# Patient Record
Sex: MALE | Race: BLACK OR AFRICAN AMERICAN | NOT HISPANIC OR LATINO | URBAN - METROPOLITAN AREA
[De-identification: names, ages, dates, MRNs, and addresses within clinical notes are randomized per-mention and may not be internally consistent; named-entity substitution may affect disease eponyms.]

---

## 2019-12-23 ENCOUNTER — INPATIENT (INPATIENT)
Facility: HOSPITAL | Age: 64
LOS: 2 days | Discharge: HOME | DRG: 555 | End: 2019-12-26
Attending: INTERNAL MEDICINE | Admitting: INTERNAL MEDICINE
Payer: MEDICARE

## 2019-12-23 VITALS
RESPIRATION RATE: 14 BRPM | TEMPERATURE: 98 F | OXYGEN SATURATION: 89 % | DIASTOLIC BLOOD PRESSURE: 69 MMHG | HEART RATE: 108 BPM | SYSTOLIC BLOOD PRESSURE: 108 MMHG

## 2019-12-23 DIAGNOSIS — Z85.118 PERSONAL HISTORY OF OTHER MALIGNANT NEOPLASM OF BRONCHUS AND LUNG: Chronic | ICD-10-CM

## 2019-12-23 LAB
ALBUMIN SERPL ELPH-MCNC: 3.4 G/DL — SIGNIFICANT CHANGE UP (ref 3.3–5)
ALP SERPL-CCNC: 89 U/L — SIGNIFICANT CHANGE UP (ref 40–120)
ALT FLD-CCNC: 6 U/L — LOW (ref 10–45)
ANION GAP SERPL CALC-SCNC: 14 MMOL/L — SIGNIFICANT CHANGE UP (ref 5–17)
AST SERPL-CCNC: 13 U/L — SIGNIFICANT CHANGE UP (ref 10–40)
BASOPHILS # BLD AUTO: 0.04 K/UL — SIGNIFICANT CHANGE UP (ref 0–0.2)
BASOPHILS NFR BLD AUTO: 0.5 % — SIGNIFICANT CHANGE UP (ref 0–2)
BILIRUB SERPL-MCNC: 0.2 MG/DL — SIGNIFICANT CHANGE UP (ref 0.2–1.2)
BUN SERPL-MCNC: 13 MG/DL — SIGNIFICANT CHANGE UP (ref 7–23)
CALCIUM SERPL-MCNC: 8.1 MG/DL — LOW (ref 8.4–10.5)
CHLORIDE SERPL-SCNC: 102 MMOL/L — SIGNIFICANT CHANGE UP (ref 96–108)
CO2 SERPL-SCNC: 22 MMOL/L — SIGNIFICANT CHANGE UP (ref 22–31)
CREAT SERPL-MCNC: 1.57 MG/DL — HIGH (ref 0.5–1.3)
EOSINOPHIL # BLD AUTO: 0.07 K/UL — SIGNIFICANT CHANGE UP (ref 0–0.5)
EOSINOPHIL NFR BLD AUTO: 0.8 % — SIGNIFICANT CHANGE UP (ref 0–6)
GAS PNL BLDV: SIGNIFICANT CHANGE UP
GLUCOSE SERPL-MCNC: 109 MG/DL — HIGH (ref 70–99)
HBA1C BLD-MCNC: 5.6 % — SIGNIFICANT CHANGE UP (ref 4–5.6)
HCT VFR BLD CALC: 30.1 % — LOW (ref 39–50)
HGB BLD-MCNC: 9.2 G/DL — LOW (ref 13–17)
IMM GRANULOCYTES NFR BLD AUTO: 0.3 % — SIGNIFICANT CHANGE UP (ref 0–1.5)
LYMPHOCYTES # BLD AUTO: 0.92 K/UL — LOW (ref 1–3.3)
LYMPHOCYTES # BLD AUTO: 10.5 % — LOW (ref 13–44)
MCHC RBC-ENTMCNC: 26.7 PG — LOW (ref 27–34)
MCHC RBC-ENTMCNC: 30.6 GM/DL — LOW (ref 32–36)
MCV RBC AUTO: 87.5 FL — SIGNIFICANT CHANGE UP (ref 80–100)
MONOCYTES # BLD AUTO: 0.49 K/UL — SIGNIFICANT CHANGE UP (ref 0–0.9)
MONOCYTES NFR BLD AUTO: 5.6 % — SIGNIFICANT CHANGE UP (ref 2–14)
NEUTROPHILS # BLD AUTO: 7.23 K/UL — SIGNIFICANT CHANGE UP (ref 1.8–7.4)
NEUTROPHILS NFR BLD AUTO: 82.3 % — HIGH (ref 43–77)
NRBC # BLD: 0 /100 WBCS — SIGNIFICANT CHANGE UP (ref 0–0)
PCP SPEC-MCNC: SIGNIFICANT CHANGE UP
PLATELET # BLD AUTO: 335 K/UL — SIGNIFICANT CHANGE UP (ref 150–400)
POTASSIUM SERPL-MCNC: 3.4 MMOL/L — LOW (ref 3.5–5.3)
POTASSIUM SERPL-SCNC: 3.4 MMOL/L — LOW (ref 3.5–5.3)
PROT SERPL-MCNC: 7.3 G/DL — SIGNIFICANT CHANGE UP (ref 6–8.3)
RBC # BLD: 3.44 M/UL — LOW (ref 4.2–5.8)
RBC # FLD: 14.5 % — SIGNIFICANT CHANGE UP (ref 10.3–14.5)
SODIUM SERPL-SCNC: 138 MMOL/L — SIGNIFICANT CHANGE UP (ref 135–145)
TSH SERPL-MCNC: 6.98 UIU/ML — HIGH (ref 0.35–4.94)
WBC # BLD: 8.78 K/UL — SIGNIFICANT CHANGE UP (ref 3.8–10.5)
WBC # FLD AUTO: 8.78 K/UL — SIGNIFICANT CHANGE UP (ref 3.8–10.5)

## 2019-12-23 PROCEDURE — 70450 CT HEAD/BRAIN W/O DYE: CPT | Mod: 26

## 2019-12-23 PROCEDURE — 99223 1ST HOSP IP/OBS HIGH 75: CPT | Mod: GC

## 2019-12-23 PROCEDURE — 99232 SBSQ HOSP IP/OBS MODERATE 35: CPT

## 2019-12-23 PROCEDURE — 99285 EMERGENCY DEPT VISIT HI MDM: CPT

## 2019-12-23 PROCEDURE — 71045 X-RAY EXAM CHEST 1 VIEW: CPT | Mod: 26

## 2019-12-23 PROCEDURE — 93010 ELECTROCARDIOGRAM REPORT: CPT

## 2019-12-23 RX ORDER — SODIUM CHLORIDE 9 MG/ML
1000 INJECTION INTRAMUSCULAR; INTRAVENOUS; SUBCUTANEOUS ONCE
Refills: 0 | Status: COMPLETED | OUTPATIENT
Start: 2019-12-23 | End: 2019-12-23

## 2019-12-23 RX ORDER — IPRATROPIUM/ALBUTEROL SULFATE 18-103MCG
3 AEROSOL WITH ADAPTER (GRAM) INHALATION ONCE
Refills: 0 | Status: COMPLETED | OUTPATIENT
Start: 2019-12-23 | End: 2019-12-23

## 2019-12-23 RX ORDER — DEXAMETHASONE 0.5 MG/5ML
10 ELIXIR ORAL ONCE
Refills: 0 | Status: COMPLETED | OUTPATIENT
Start: 2019-12-23 | End: 2019-12-23

## 2019-12-23 RX ADMIN — Medication 1 MILLIGRAM(S): at 20:18

## 2019-12-23 RX ADMIN — Medication 3 MILLILITER(S): at 17:11

## 2019-12-23 RX ADMIN — Medication 10 MILLIGRAM(S): at 21:33

## 2019-12-23 RX ADMIN — SODIUM CHLORIDE 1000 MILLILITER(S): 9 INJECTION INTRAMUSCULAR; INTRAVENOUS; SUBCUTANEOUS at 17:11

## 2019-12-23 RX ADMIN — Medication 102 MILLIGRAM(S): at 18:31

## 2019-12-23 RX ADMIN — Medication 3 MILLILITER(S): at 17:20

## 2019-12-23 RX ADMIN — Medication 3 MILLILITER(S): at 17:12

## 2019-12-23 NOTE — ED ADULT TRIAGE NOTE - CHIEF COMPLAINT QUOTE
BIB EMS from methadone clinic, pt states "my legs don't work" though pt able to stand from EMS stretcher, pt noted to be 87 on RA though denies SOB; pt A/Ox3, denies alcohol/drug use today

## 2019-12-23 NOTE — H&P ADULT - PROBLEM SELECTOR PLAN 6
Hb 9.2, Pt describes complicated recent course at [facility] with large volume epistaxis   -F/u iron studies #PAD  States had "stent" in LLE, was on Plavix for stent, then stopped when had large volume epistaxis. Also left big toe amputation s/p OM. A1c 5.6   -Vascular consult in AM    Addendum: absent DP pulses on exam w/ slightly cool extremities, consult vascular in AM

## 2019-12-23 NOTE — H&P ADULT - ATTENDING COMMENTS
patient seen and examined a/f lower extremity weakness, rule out brain mets. pt poor historian, lethargic at times but able to awake and follow commands; pt denies fecal / urinary incontinence ; denies fevers  reviewed pertinent data  PE  findings:   gen: asleep, lethargic, able to wake up to voice, follows commands, oriented to person, place (LHH), unaware of time or does not wish to answer.   heent: no photophobia b/l, dry tongue  cvs: s1s2  lungs: decreased breath sounds b/l, pox iniitally 83% on RA, increased to 95-96% on 5LPM NC  chest: scar on left side of chest   abd: soft/nt/nd  ext: absent DP pulse; s/p toe amputations at right foot and partial left 3rd toe amputation   neuro: lethargic, follows commands when awake but falls back asleep, unable to cooperate with all examination 2/2 lethargy; 5/5 hand  b/l ; patient able to lift legs b/l, 5/5 motor at lower ext b/l   rectal: deferred as pt unable to agree to rectal exam    a/p:   1. weakness/ encephalopathy: possible brain mets, in addition pt was given ativan prior to MRI (not done 2/2 inability to consent); followup MRI brain and L spine; followup neurosurgery recs  2. hypoxia: occuring after given ativan for MRI, along w/ lung cancer history, pox improving on NC; monitor respiratory status, check ABG, low threshold for ICU consult if worsening respiratory sxs  3. lung cancer: check CT chest  4. methadone use: confirm methadone dose w/ clinic; pt w/ methadone in pockets, to be held for now.  5. obtain more collateral in AM patient seen and examined a/f lower extremity weakness, rule out brain mets. pt poor historian, lethargic at times but able to awake and follow commands; pt denies fecal / urinary incontinence ; denies fevers  reviewed pertinent data  PE  findings:   gen: asleep, lethargic, able to wake up to voice, follows commands, oriented to person, place (LHH), unaware of time or does not wish to answer.   heent: no photophobia b/l, dry tongue  cvs: s1s2  lungs: decreased breath sounds b/l, pox iniitally 83% on RA, increased to 95-96% on 5LPM NC  chest: scar on left side of chest   abd: soft/nt/nd  ext: absent DP pulse; s/p toe amputations at right foot and partial left 3rd toe amputation   neuro: lethargic, follows commands when awake but falls back asleep, unable to cooperate with all examination 2/2 lethargy; 5/5 hand  b/l ; patient able to lift legs b/l, 5/5 motor at lower ext b/l   rectal: deferred as pt unable to agree to rectal exam    a/p:   1. weakness/ encephalopathy: possible brain mets, in addition pt was given ativan prior to MRI (not done 2/2 inability to consent); followup MRI brain and L spine; followup neurosurgery recs  2. hypoxia: occuring after given ativan for MRI, along w/ lung cancer history, pox improving on NC; monitor respiratory status, check ABG, low threshold for ICU consult if worsening respiratory sxs  3. lung cancer: check CT chest  4. abnormal EKG: T wave inversions noted at v1-v5, follow troponins, and repeat EKG in AM    5. methadone use: confirm methadone dose w/ clinic; pt w/ methadone in pockets, to be held for now.  6. obtain more collateral in AM patient seen and examined a/f lower extremity weakness, rule out brain mets. pt poor historian, lethargic at times but able to awake and follow commands; pt denies fecal / urinary incontinence ; denies fevers  reviewed pertinent data  PE  findings:   gen: asleep, lethargic, able to wake up to voice, follows commands, oriented to person, place (LHH), unaware of time or does not wish to answer.   heent: no photophobia b/l, dry tongue  cvs: s1s2  lungs: decreased breath sounds b/l, pox iniitally 83% on RA, increased to 95-96% on 5LPM NC  chest: scar on left side of chest   abd: soft/nt/nd  ext: absent DP pulse; s/p toe amputations at right foot and partial left 3rd toe amputation   neuro: lethargic, follows commands when awake but falls back asleep, unable to cooperate with all examination 2/2 lethargy; 5/5 hand  b/l ; patient able to lift legs b/l, 5/5 motor at lower ext b/l   rectal: deferred as pt unable to agree to rectal exam    a/p:   1. weakness/ encephalopathy: possible brain mets, in addition pt was given ativan prior to MRI (not done 2/2 inability to consent); followup MRI brain and L spine; followup neurosurgery recs  2. hypoxia: occuring after given ativan for MRI, along w/ lung cancer history, pox improving on NC; monitor respiratory status, check ABG, low threshold for ICU consult if worsening respiratory sxs  3. lung cancer: check CT chest  4. abnormal EKG: T wave inversions noted at v1-v5, follow troponins, and repeat EKG in AM    5. methadone use: confirm methadone dose w/ clinic; pt w/ methadone in pockets, to be held for now.  6. obtain more collateral in AM    ADDENDUM: more awake, alert, responsive and following commands on regional floor; additional PE findings: Right lateral strabismus; knee OA b/l ; FROM of knees b/l

## 2019-12-23 NOTE — H&P ADULT - PROBLEM SELECTOR PLAN 8
F: none  E: replete PRN   N: dash/tlc/cc  DVT: lovenox 40  Dispo: RMF  Code: Full Has bottles for Methadone 80mg from Dr. Gómez [address] [facility] [phone]

## 2019-12-23 NOTE — CONSULT NOTE ADULT - SUBJECTIVE AND OBJECTIVE BOX
64M PMH lung ca (not on any current tx, unknown if any prior tx), prior heroin abuse now on methadone, denies any other PMH/PSH p/w b/l LE weakness, since today. States that every time he stands up he feels like his legs are giving out. HAs no other systemic symptoms. Denies HA, SOB/CP, f/c, URI symptoms, NVD, abd pain, urinary complaints, black/bloody stool, neck/back pain, rashes, vision changes, LE pain/swelling. Denies SI/HI, toxic ingestions. No vertiginous symptoms.    < from: CT Head No Cont (12.23.19 @ 17:00) >  microangiopathy disease. However, there may be a component from vasogenic edema, as there are couple of areas in left frontal subcortical white matter where there is concern for underlying lesions that could represent intracranial metastatic disease. Brain MR with contrast is recommended.    < end of copied text >    Exam:  AA&OX3, NAD, cohere speech, follows all commands,  disconjugate gaze, PERRL, face symmetric, tongue protr midline  motor 5/5 x 4 extr, no drift, no dysmetria  sensation to LT grossly intact throughout      Assessment and Recommendations:  Pt is  64M PMH lung ca (not on any current tx, unknown if any prior tx), prior heroin abuse now on methadone, presents to ED s/p fall " legs gave out", no LOC, Head CT: suspicious for mets  - MRI brain w/ & w/o cont  - Will make final recs upon completion of MRI brain  - D/w Dr. Moraes 64M PMH lung ca (not on any current tx, unknown if any prior tx), prior heroin abuse now on methadone, denies any other PMH/PSH p/w b/l LE weakness, since today. States that every time he stands up he feels like his legs are giving out. HAs no other systemic symptoms. Denies HA, SOB/CP, f/c, URI symptoms, NVD, abd pain, urinary complaints, black/bloody stool, neck/back pain, rashes, vision changes, LE pain/swelling. Denies SI/HI, toxic ingestions. No vertiginous symptoms.    Neurosurgery was consulted for Head CT suspicious for brain mets s/p mechanical fall at methadone clinic "due to legs giving out", witnessed, no LOC, , pt is poor historian, s/p left  chest tube [facility] 11/2019, pt states he was told he is cancer free, pt does not know if he has an oncologist.  Pt denies headaches, acute visual changes      < from: CT Head No Cont (12.23.19 @ 17:00) >  microangiopathy disease. However, there may be a component from vasogenic edema, as there are couple of areas in left frontal subcortical white matter where there is concern for underlying lesions that could represent intracranial metastatic disease. Brain MR with contrast is recommended.    < end of copied text >    Exam:  AA&OX3, NAD, cohere speech, follows all commands,  disconjugate gaze, PERRL, face symmetric, tongue protr midline  motor 5/5 x 4 extr, no drift, no dysmetria  sensation to LT grossly intact throughout      Assessment and Recommendations:  Pt is  64M PMH lung ca (not on any current tx, unknown if any prior tx), prior heroin abuse now on methadone, presents to ED s/p fall " legs gave out", no LOC, Head CT: suspicious for mets  - MRI brain w/ & w/o cont  - Will make final recs upon completion of MRI brain  - D/w Dr. Moraes

## 2019-12-23 NOTE — H&P ADULT - PROBLEM SELECTOR PLAN 7
Has bottles for Methadone 80mg from Dr. Gómez [address] [facility] [phone] Hb 9.2, Pt describes complicated recent course at [facility] with large volume epistaxis   -F/u iron studies

## 2019-12-23 NOTE — H&P ADULT - NSHPSOCIALHISTORY_GEN_ALL_CORE
former smoker (quit 5 years ago)  denies ETOH use  former drug use on methadone former smoker (quit 5 years ago)  denies ETOH use  former drug use on methadone  lives with wife/son

## 2019-12-23 NOTE — CONSULT NOTE ADULT - ATTENDING COMMENTS
Patient seen and examined by me on 12/24/19. Consultation called due to radiologist report of head CT indicating possible metastatic disease. On my review of CT head, there is no definitive evidence of metastatic disease and so will need MRI brain with and without contrast to be sure. Patient is awake, alert, following commands, BENTLEY x 4, able to ambulate independently. No neurosurgical intervention planned at this point. Will folllowup pending brain MRI with and without contrast findings.    Jeremias Moraes M.D.

## 2019-12-23 NOTE — ED ADULT NURSE NOTE - CHPI ED NUR SYMPTOMS NEG
no cough/no edema/no headache/no hemoptysis/no fever/no body aches/no diaphoresis/no chills/no chest pain/no wheezing

## 2019-12-23 NOTE — H&P ADULT - PROBLEM SELECTOR PLAN 4
Cre1.54, unclear baseline, no urinary complaints s/p 1L NS in ED   -Trend for now    #Hypothroidism   TSH 6.9, not on any thyroid medications per med rec Cre1.54, unclear baseline, no urinary complaints s/p 1L NS in ED   -Trend for now

## 2019-12-23 NOTE — H&P ADULT - PROBLEM SELECTOR PLAN 2
22/ Methadone use? Then ativan for MRI pretreatment. Now resolved.    #Emphysema/COPD  Severe confluent bullous emphysematous changes in the bilateral upper lung zones.Dilation the main pulmonary artery measuring up to 4.2 cm. Area of segmental atelectasis in the right lower lobe. No obvious metastasis are seen. On incruse ellipta,   C/w Spiriva and Albuterol PRN

## 2019-12-23 NOTE — H&P ADULT - PROBLEM SELECTOR PLAN 1
Pt BIBEMS after being found down in street or methadone clinic Pt BIBEMS after being found down in street by methadone clinic, c/o progressive weakness since d/c from [place], asymmetric LLE>RLE weakness, mute babinksi, (however unclear if also due to neuropathy) without fevers, urinary retention, saddle anesthesia. CTH with possible vasogenic edema, concern for metastatic disease. Folate 2.8. S/p Decadron 10 in ED  -Consent Pt for MRI head and lumbar spine (states no device or metal in body)   -Will hold further decadron for now as no FND  -Start folate 1g qd   -Untreated hypothyroid: f/u T4/T3  -F/u RPR (denies hx)  -PT consult Pt BIBEMS after being found down in street by methadone clinic, c/o progressive weakness since d/c from [place], asymmetric LLE>RLE weakness, mute babinksi, (however unclear if also due to neuropathy) without fevers, urinary retention, saddle anesthesia. CTH with possible vasogenic edema, concern for metastatic disease. Folate 2.8. S/p Decadron 10 in ED  -Consent Pt for MRI head and lumbar spine (states no device or metal in body)   -Will hold further decadron for now as no FND  -Start folate 1g qd   -Untreated hypothyroid: f/u T4/T3  -F/u RPR (denies hx)  -PT consult    ADDENDUM: pt more awake and alert on regional floor; reports having chronic knee pain causing legs to give way, may have element of knee OA , but given CT head findings w/ hx of lung cancer, will need to rule out brain and lumbar spinal cord lesions.

## 2019-12-23 NOTE — ED ADULT NURSE NOTE - OBJECTIVE STATEMENT
BIB EMS from methadone clinic, pt states "my legs don't work" though pt able to stand from EMS stretcher, pt noted to be 89 on RA though denies SOB; pt A/Ox3, denies alcohol/drug use today.  Upon further questioning pt reports taking methadone today. Eyes are dilated, Pt is alert and oriented x3. Pt reports intermittent SOB, recent hx of lung CA surgery.  Lung sounds diminished, pt placed on 4L via nasal cannula.  Pt changed into yellow gown. Denies chest pain, SOB, abd pain, fever/chills, D/N/V.  Alert and oriented x3.

## 2019-12-23 NOTE — H&P ADULT - HISTORY OF PRESENT ILLNESS
64M PMH lung ca (not on any current tx, unknown if any prior tx stating "they took it out"), s/p left  chest tube [facility] 11/2019, prior heroin abuse now on methadone, poor historian denies any other PMH/PSH p/w b/l LE weakness, since "Thursday". States that every time he stands up he feels like his legs are giving out. Denies HA, SOB/CP, f/c, URI symptoms, NVD, abd pain, urinary complaints, incontinence, states full sensation on foot exam but not withdrawing appropriately. States he took his methadone today, no other recreational drugs    Pt is poor historian, now sedated on Ativan and cannot reliably give hx, per pharmacy chart review has been written for Plavix, Lipitor, incruse ellipta, multiple opioids. Has multiple bottles of Methodone 80mg in his posessions with his name dated for over [place] holiday. 64M PMH lung ca (not on any current tx, unknown if any prior tx stating "they took it out"), s/p left  chest tube [facility] 11/2019, prior heroin abuse now on methadone, poor historian denies any other PMH/PSH p/w b/l LE weakness, since "Thursday". States that every time he stands up he feels like his legs are giving out. Denies abd pain, urinary complaints, incontinence, states full sensation on foot exam but not withdrawing appropriately. States he took his methadone today, denies recreational drugs    Per discussion with ED provider: Pt was a poor historian, intoxicated appearing, and lethargic then become more alert throughout day, any to stand with assist although still a poor historian. At the time of this interview, Pt now sedated on Ativan and cannot reliably give hx, per pharmacy chart review has been written for Plavix, Lipitor, incruse ellipta, multiple opioids. Has multiple bottles of Methodone 80mg in his possession with his name dated for over [place] holiday 64M PMH lung cancer s/p lobectomy (one month ago at [facility] c/b PNA, SBO(?), large volume epistasis) PAD with left stenting, osteomyelitis of left big toe s/p amputation, heroin use now on methadone presents with progressive LE weakness has been "collapsing" since he was discharged from [facility], saying legs just "give out"  (not on any current tx, unknown if any prior tx stating "they took it out"), s/p left  chest tube [facility] 11/2019, prior heroin abuse now on methadone, poor historian denies any other PMH/PSH p/w b/l LE weakness, since "Thursday". States that every time he stands up he feels like his legs are giving out. Denies abd pain, urinary complaints, incontinence, states full sensation on foot exam but not withdrawing appropriately. States he took his methadone today, denies recreational drugs    Per discussion with ED provider: Pt was a poor historian, intoxicated appearing, and lethargic then become more alert throughout day, any to stand with assist although still a poor historian. At the time of this interview, Pt now sedated on Ativan and cannot reliably give hx, per pharmacy chart review has been written for Plavix, Lipitor, incruse ellipta, multiple opioids. Has multiple bottles of Methodone 80mg in his possession with his name dated for over [place] holiday 64M PMH lung cancer s/p lobectomy (one month ago at [facility] c/b PNA, SBO(?), large volume epistasis) PAD with left stenting, osteomyelitis of left big toe s/p amputation, COPD/emphysema, heroin use now on methadone presents with progressive LE weakness has been "collapsing" since he was discharged from [place], saying legs just "give out".  No fevers, chills, nightsweats, backpain. Denies abd pain, urinary complaints, incontinence,retention states full sensation in perianal area, full sensation on foot exam but not withdrawing appropriately. States he took his methadone today, denies recreational drugs    Per discussion with ED provider: Pt was a poor historian, intoxicated appearing, and lethargic then become more alert throughout day, able to stand with assist although still a poor historian. At the time of this interview, Pt now sedated on Ativan (pretreated for MRI, but then could not be consented for MRI) and cannot reliably give hx, per pharmacy chart review has been written for Plavix, Lipitor, incruse ellipta, multiple opioids. Has multiple bottles of Methodone 80mg in his possession with his name dated for over [place] holiday. Later in ED course: Pt more awake, poor health literacy but able to give history. No HA, SoB, pre-syncope, CP/pressure, n/v/d/c, myalgia, arthralgia, but also stating knees "give out"     T97.7, -102, //83, RR 14-77, 77-94% on RA  Labs: EBC 8.78, Hb 9.2, Cre 1.57, TSH 6.977, folate low, trop neg x2   EKG: NSR, possible LVH, , TWI in V1-V4  CTH: microangioathic disease, may be a component from vasogenic edema, as there are couple of areas in left frontal subcortical white matter where there is concern for underlying lesions that could represent intracranial metastatic disease  CT chest: s/p LIZY lobectomy, emphysematous changes   ED interventiosn: Dexamethasone 10mg IVP, NS 1L Duoneb x3 Lorazepam 1mg

## 2019-12-23 NOTE — ED PROVIDER NOTE - PROGRESS NOTE DETAILS
[place]: satting high 90s on NC. Mild anemia and cr 1.6 w/ unknown baseline, other labs grossly wnl. d/w attending radiologist, CTH concerning for possible mets. Will order MRI, neurosurg consulted, will give decadron. PT still very sleepy but easily arousable and in no resp distress. Will reassess. [place]: PT now completely awake. Still c/o b/l LE weakness. Initial sleepiness likely tox related. Pt w/ very slow and slightly unsteady gait which he states is completely new for him. Awaiting MRI, neurosurg eval. Either way pt unsafe for dc. Will admit medicine for further care. [place]: PT now completely awake. Still c/o b/l LE weakness. Initial sleepiness likely tox related. Pt w/ very slow and slightly unsteady gait which he states is completely new for him. Awaiting MRI, neurosurg eval. Either way pt unsafe for dc. Will admit medicine for further care. PT has no SOB or any cp at all. Sats high 80s to low 90s. Likely baseline 2/2 lung ca.

## 2019-12-23 NOTE — H&P ADULT - ASSESSMENT
64M PMH lung cancer s/p lobectomy (one month ago at [facility] c/b PNA, SBO(?), large volume epistasis) PAD with left stenting, osteomyelitis of left big toe s/p amputation, COPD/emphysema, heroin use now on methadone presents with progressive LE weakness has been "collapsing" since he was discharged from [place], saying legs just "give out".

## 2019-12-23 NOTE — H&P ADULT - NSICDXPASTMEDICALHX_GEN_ALL_CORE_FT
PAST MEDICAL HISTORY:  Lung cancer     Methadone dependence PAST MEDICAL HISTORY:  Lung cancer     Methadone dependence     PAD (peripheral artery disease) states had stenting in left leg within last year, was on Plavix now off

## 2019-12-23 NOTE — ED PROVIDER NOTE - OBJECTIVE STATEMENT
Very poor historian  64M PMH lung ca (not on any current tx, unknown if any prior tx), prior heroin abuse now on methadone, denies any other PMH/PSH p/w b/l LE weakness, since today. States that every time he stands up he feels like his legs are giving out. HAs no other systemic symptoms. Denies HA, SOB/CP, f/c, URI symptoms, NVD, abd pain, urinary complaints, black/bloody stool, neck/back pain, rashes, vision changes, LE pain/swelling. Denies SI/HI, toxic ingestions. No vertiginous symptoms.

## 2019-12-23 NOTE — H&P ADULT - PROBLEM SELECTOR PLAN 3
Presents s/p LIZY lobectomy, told by his surgeon Dr Padma Irby at [facility] "cancer is all gone"   -Dr Irby collateral [phone]

## 2019-12-23 NOTE — H&P ADULT - PROBLEM SELECTOR PLAN 9
1) PCP Contacted on Admission: (Y/N) --> Name & Phone #:  2) Date of Contact with PCP:  3) PCP Contacted at Discharge: (Y/N, N/A)  4) Summary of Handoff Given to PCP:   5) Post-Discharge Appointment Date and Location: F: none  E: replete PRN   N: dash/tlc/cc  DVT: lovenox 40  Dispo: [facility]  Code: Full

## 2019-12-23 NOTE — H&P ADULT - PROBLEM SELECTOR PLAN 5
#PAD  States had "stent" in LLE, was on Plavix for stent, then stopped when had large volume epistaxis. Also left big toe amputation s/p OM. A1c 5.6   -Vascular consult in AM TSH 6.9, not on any thyroid medications per med rec.    ADDENDUM: check TFTs

## 2019-12-23 NOTE — ED PROVIDER NOTE - CLINICAL SUMMARY MEDICAL DECISION MAKING FREE TEXT BOX
64M PMH lung ca (not on any current tx, unknown if any prior tx), prior heroin abuse now on methadone, denies any other PMH/PSH p/w b/l LE weakness, since today. States that every time he stands up he feels like his legs are giving out. HAs no other systemic symptoms. Hypoxic to high 80s on RA, satting well on NC, mild tachycardia, other vitals wnl. Exam as above.  ddx: clinically not CVA. Possible tox vs. intracranial (cancer) vs. metabolic. Hypoxia likely 2/2 lung ca, less likely PE or ACS (has no symptoms).  cbc, cmp, trop, bnp, IVF, CTH, nebs, reassess.  Though pt is very sleepy, pt has no resp distress and easily arousable. Clinically no specific toxidrome or withdrawal syndrome.

## 2019-12-23 NOTE — ED ADULT NURSE NOTE - NSIMPLEMENTINTERV_GEN_ALL_ED
Implemented All Fall Risk Interventions:  [place] to call system. Call bell, personal items and telephone within reach. Instruct patient to call for assistance. Room bathroom lighting operational. Non-slip footwear when patient is off stretcher. Physically safe environment: no spills, clutter or unnecessary equipment. Stretcher in lowest position, wheels locked, appropriate side rails in place. Provide visual cue, wrist band, yellow gown, etc. Monitor gait and stability. Monitor for mental status changes and reorient to person, place, and time. Review medications for side effects contributing to fall risk. Reinforce activity limits and safety measures with patient and family.

## 2019-12-23 NOTE — H&P ADULT - NSHPPHYSICALEXAM_GEN_ALL_CORE
.  VITAL SIGNS:  T(C): 36.8 (12-24-19 @ 00:46), Max: 36.8 (12-24-19 @ 00:46)  T(F): 98.2 (12-24-19 @ 00:46), Max: 98.2 (12-24-19 @ 00:46)  HR: 91 (12-24-19 @ 00:46) (91 - 108)  BP: 127/80 (12-24-19 @ 00:46) (108/69 - 166/83)  BP(mean): --  RR: 16 (12-24-19 @ 00:46) (14 - 16)  SpO2: 94% (12-24-19 @ 00:46) (77% - 94%)  Wt(kg): --    PHYSICAL EXAM:    Constitutional: chronically ill appearing, diaphoretic, somnolent/inattentive, will answer appropriate with maximal sternal rub, improves with repeated attempts   Head: NC/AT  Eyes: PERRL, EOMI, anicteric sclera  ENT: no nasal discharge; uvula midline, no oropharyngeal erythema or exudates; MMM  Neck: supple; no JVD or thyromegaly  Respiratory: decreased breath sounds at bases, scattered rhonchi, 1cm scar similar to chest tube insertion site scabbed over on left back  Cardiac: +S1/S2; RRR; no M/R/G; PMI non-displaced  Gastrointestinal: soft, NT/ND; no rebound or guarding; +BSx4  Extremities: WWP, no clubbing or cyanosis; no peripheral edema  Musculoskeletal: NROM x4; no joint swelling, tenderness or erythema  LLE: 5/5 strength, poorly maintained feet, pulses 2+, decreased sensation over plantar regionsmute babinski   RLE: 4/5 obvious deficit compared to left but improves with repeated attempts, big toe amputation, drainage from shallow ulcers, decreased sensation globally, pulses 2+  Vascular: 2+ radial, femoral, DP/PT pulses B/L  Dermatologic: dry course skin   Lymphatic: no submandibular or cervical LAD  Neurologic: not participatory on exam, grossly normal   Psychiatric: somnolent, intoxicated appearing .  VITAL SIGNS:  T(C): 36.8 (12-24-19 @ 00:46), Max: 36.8 (12-24-19 @ 00:46)  T(F): 98.2 (12-24-19 @ 00:46), Max: 98.2 (12-24-19 @ 00:46)  HR: 91 (12-24-19 @ 00:46) (91 - 108)  BP: 127/80 (12-24-19 @ 00:46) (108/69 - 166/83)  BP(mean): --  RR: 16 (12-24-19 @ 00:46) (14 - 16)  SpO2: 94% (12-24-19 @ 00:46) (77% - 94%)  Wt(kg): --    PHYSICAL EXAM:    Constitutional: chronically ill appearing, diaphoretic, somnolent/inattentive, will answer appropriate with maximal sternal rub, improves with repeated attempts   Head: NC/AT  Eyes: PERRL, EOMI, anicteric sclera  ENT: no nasal discharge; uvula midline, no oropharyngeal erythema or exudates; MMM  Neck: supple; no JVD or thyromegaly  Respiratory: decreased breath sounds at bases, scattered rhonchi, 1cm scar similar to chest tube insertion site scabbed over on left back  Cardiac: +S1/S2; RRR; no M/R/G; PMI non-displaced  Gastrointestinal: soft, NT/ND; no rebound or guarding; +BSx4  Extremities: WWP, no clubbing or cyanosis; no peripheral edema  Rectal: refused   Musculoskeletal: NROM x4; no joint swelling, tenderness or erythema  LLE: 5/5 strength, poorly maintained feet, pulses 2+, decreased sensation over plantar regionsmute babinski   RLE: 4/5 obvious deficit compared to left but improves with repeated attempts, big toe amputation, drainage from shallow ulcers, decreased sensation globally, pulses 2+  Vascular: 2+ radial, femoral, DP/PT pulses B/L  Dermatologic: dry course skin   Lymphatic: no submandibular or cervical LAD  Neurologic: not participatory on exam, grossly normal   Psychiatric: somnolent, intoxicated appearing

## 2019-12-23 NOTE — H&P ADULT - NSICDXFAMHXPERTINENTNEGATIVE_GEN_A_CORE_FT
patient unable to recall major medical issues with mother or father, stated no cancer in either mother or father

## 2019-12-23 NOTE — H&P ADULT - NSHPLABSRESULTS_GEN_ALL_CORE
.  LABS:                         9.2    8.78  )-----------( 335      ( 23 Dec 2019 16:19 )             30.1     12-23    138  |  102  |  13  ----------------------------<  109<H>  3.4<L>   |  22  |  1.57<H>    Ca    8.1<L>      23 Dec 2019 16:19    TPro  7.3  /  Alb  3.4  /  TBili  0.2  /  DBili  x   /  AST  13  /  ALT  6<L>  /  AlkPhos  89  12-23        CARDIAC MARKERS ( 23 Dec 2019 23:45 )  x     / <0.01 ng/mL / x     / x     / x      CARDIAC MARKERS ( 23 Dec 2019 16:19 )  x     / <0.01 ng/mL / x     / x     / x          Serum Pro-Brain Natriuretic Peptide: 573 pg/mL (12-23 @ 16:19)        RADIOLOGY, EKG & ADDITIONAL TESTS: Reviewed. .  LABS:                         9.2    8.78  )-----------( 335      ( 23 Dec 2019 16:19 )             30.1     12-23    138  |  102  |  13  ----------------------------<  109<H>  3.4<L>   |  22  |  1.57<H>    Ca    8.1<L>      23 Dec 2019 16:19    TPro  7.3  /  Alb  3.4  /  TBili  0.2  /  DBili  x   /  AST  13  /  ALT  6<L>  /  AlkPhos  89  12-23        CARDIAC MARKERS ( 23 Dec 2019 23:45 )  x     / <0.01 ng/mL / x     / x     / x      CARDIAC MARKERS ( 23 Dec 2019 16:19 )  x     / <0.01 ng/mL / x     / x     / x          Serum Pro-Brain Natriuretic Peptide: 573 pg/mL (12-23 @ 16:19)        RADIOLOGY, EKG & ADDITIONAL TESTS: Reviewed.    < from: CT Head No Cont (12.23.19 @ 17:00) >    IMPRESSION:   No acute intracranial hemorrhage, midline shift, or mass effect.    Small vessel ischemic changes.      < end of copied text >    < from: CT Head No Cont (12.23.19 @ 17:00) >    IMPRESSION:   No acute intracranial hemorrhage, midline shift, or mass effect.    Small vessel ischemic changes.  < from: CT Head No Cont (12.23.19 @ 17:00) >    Agree that there isconfluent diminished attenuation within the periventricular and subcortical white matter, which likely reflects microangiopathy disease. However, there may be a component from vasogenic edema, as there are couple of areas in left frontal subcortical white matter where there is concern for underlying lesions that could represent intracranial metastatic disease. Brain MR with contrast is recommended.    < end of copied text >          < end of copied text >    < from: CT Chest No Cont (12.24.19 @ 01:17) >    Findings:   Patient appears to be status post left upper lobe lobectomy.   Severe confluent bullous emphysematous changes in the bilateral upper lung zones.  Dilation the main pulmonary artery measuring up to 4.2 cm.  Area of segmental atelectasis in the right lower lobe.  No obvious metastasis are seen.    Impression:  No obvious metastasis are seen.    Severe confluent bullous emphysematous changes.    Dilation of main pulmonary artery which could be a sequela pulmonary hypertension.    < end of copied text >

## 2019-12-23 NOTE — ED PROVIDER NOTE - PHYSICAL EXAMINATION
No spinal ttp, neck FROM. Strength 5/5. No bony ttp, FROM all extremities. Normal equal distal pulses. Pt states he is unable to stand.  Very sleepy, arousable to loud verbal stimuli but will fall back asleep once he stops talking.   No clonus, rigidity, tremors, fasciculations. PERRL, EOMI, no nystagmus. Normal bowel sounds, skin temp/color.   Pt has scab to L lateral chest - states he had procedure done there for his lungs a few weeks ago.  resp: decreased breath sounds b/l . No spinal ttp, neck FROM. Strength 5/5. No bony ttp, FROM all extremities. Normal equal distal pulses. Pt states he is unable to stand.  Very sleepy, arousable to loud verbal stimuli but will fall back asleep once he stops talking.   No clonus, rigidity, tremors, fasciculations. PERRL, EOMI, no nystagmus. R lateral eye deviation which pt states is chronic. Normal bowel sounds, skin temp/color.   Pt has scab to L lateral chest - states he had procedure done there for his lungs a few weeks ago.  resp: decreased breath sounds b/l .

## 2019-12-24 DIAGNOSIS — R79.89 OTHER SPECIFIED ABNORMAL FINDINGS OF BLOOD CHEMISTRY: ICD-10-CM

## 2019-12-24 DIAGNOSIS — G92 TOXIC ENCEPHALOPATHY: ICD-10-CM

## 2019-12-24 DIAGNOSIS — J96.01 ACUTE RESPIRATORY FAILURE WITH HYPOXIA: ICD-10-CM

## 2019-12-24 DIAGNOSIS — Z85.118 PERSONAL HISTORY OF OTHER MALIGNANT NEOPLASM OF BRONCHUS AND LUNG: ICD-10-CM

## 2019-12-24 DIAGNOSIS — Z29.9 ENCOUNTER FOR PROPHYLACTIC MEASURES, UNSPECIFIED: ICD-10-CM

## 2019-12-24 DIAGNOSIS — Z89.429 ACQUIRED ABSENCE OF OTHER TOE(S), UNSPECIFIED SIDE: Chronic | ICD-10-CM

## 2019-12-24 DIAGNOSIS — R53.1 WEAKNESS: ICD-10-CM

## 2019-12-24 DIAGNOSIS — I73.9 PERIPHERAL VASCULAR DISEASE, UNSPECIFIED: ICD-10-CM

## 2019-12-24 DIAGNOSIS — F11.20 OPIOID DEPENDENCE, UNCOMPLICATED: ICD-10-CM

## 2019-12-24 DIAGNOSIS — Z91.89 OTHER SPECIFIED PERSONAL RISK FACTORS, NOT ELSEWHERE CLASSIFIED: ICD-10-CM

## 2019-12-24 DIAGNOSIS — D64.9 ANEMIA, UNSPECIFIED: ICD-10-CM

## 2019-12-24 LAB
ALBUMIN SERPL ELPH-MCNC: 3.1 G/DL — LOW (ref 3.3–5)
ALP SERPL-CCNC: 81 U/L — SIGNIFICANT CHANGE UP (ref 40–120)
ALT FLD-CCNC: 6 U/L — LOW (ref 10–45)
ANION GAP SERPL CALC-SCNC: 13 MMOL/L — SIGNIFICANT CHANGE UP (ref 5–17)
AST SERPL-CCNC: 12 U/L — SIGNIFICANT CHANGE UP (ref 10–40)
BILIRUB SERPL-MCNC: 0.2 MG/DL — SIGNIFICANT CHANGE UP (ref 0.2–1.2)
BLD GP AB SCN SERPL QL: NEGATIVE — SIGNIFICANT CHANGE UP
BUN SERPL-MCNC: 11 MG/DL — SIGNIFICANT CHANGE UP (ref 7–23)
CALCIUM SERPL-MCNC: 8.5 MG/DL — SIGNIFICANT CHANGE UP (ref 8.4–10.5)
CHLORIDE SERPL-SCNC: 106 MMOL/L — SIGNIFICANT CHANGE UP (ref 96–108)
CK SERPL-CCNC: 266 U/L — HIGH (ref 30–200)
CO2 SERPL-SCNC: 22 MMOL/L — SIGNIFICANT CHANGE UP (ref 22–31)
CREAT SERPL-MCNC: 0.89 MG/DL — SIGNIFICANT CHANGE UP (ref 0.5–1.3)
FOLATE SERPL-MCNC: 2.8 NG/ML — LOW
GLUCOSE SERPL-MCNC: 115 MG/DL — HIGH (ref 70–99)
HCT VFR BLD CALC: 30.6 % — LOW (ref 39–50)
HCV AB S/CO SERPL IA: 7.52 S/CO — SIGNIFICANT CHANGE UP
HCV AB SERPL-IMP: REACTIVE
HGB BLD-MCNC: 9.2 G/DL — LOW (ref 13–17)
HIV 1+2 AB+HIV1 P24 AG SERPL QL IA: SIGNIFICANT CHANGE UP
IRON SATN MFR SERPL: 17 UG/DL — LOW (ref 45–165)
IRON SATN MFR SERPL: 7 % — LOW (ref 16–55)
MAGNESIUM SERPL-MCNC: 1.5 MG/DL — LOW (ref 1.6–2.6)
MCHC RBC-ENTMCNC: 26.5 PG — LOW (ref 27–34)
MCHC RBC-ENTMCNC: 30.1 GM/DL — LOW (ref 32–36)
MCV RBC AUTO: 88.2 FL — SIGNIFICANT CHANGE UP (ref 80–100)
NRBC # BLD: 0 /100 WBCS — SIGNIFICANT CHANGE UP (ref 0–0)
PLATELET # BLD AUTO: 297 K/UL — SIGNIFICANT CHANGE UP (ref 150–400)
POTASSIUM SERPL-MCNC: 3.9 MMOL/L — SIGNIFICANT CHANGE UP (ref 3.5–5.3)
POTASSIUM SERPL-SCNC: 3.9 MMOL/L — SIGNIFICANT CHANGE UP (ref 3.5–5.3)
PROT SERPL-MCNC: 7.4 G/DL — SIGNIFICANT CHANGE UP (ref 6–8.3)
RBC # BLD: 3.29 M/UL — LOW (ref 4.2–5.8)
RBC # BLD: 3.47 M/UL — LOW (ref 4.2–5.8)
RBC # FLD: 14.7 % — HIGH (ref 10.3–14.5)
RETICS #: 54.9 K/UL — SIGNIFICANT CHANGE UP (ref 25–125)
RETICS/RBC NFR: 1.7 % — SIGNIFICANT CHANGE UP (ref 0.5–2.5)
RH IG SCN BLD-IMP: POSITIVE — SIGNIFICANT CHANGE UP
SODIUM SERPL-SCNC: 141 MMOL/L — SIGNIFICANT CHANGE UP (ref 135–145)
T PALLIDUM AB TITR SER: NEGATIVE — SIGNIFICANT CHANGE UP
T3 SERPL-MCNC: 103 NG/DL — SIGNIFICANT CHANGE UP (ref 80–200)
T4 AB SER-ACNC: 5.28 UG/DL — SIGNIFICANT CHANGE UP (ref 3.17–11.72)
TIBC SERPL-MCNC: 254 UG/DL — SIGNIFICANT CHANGE UP (ref 220–430)
TRANSFERRIN SERPL-MCNC: 209 MG/DL — SIGNIFICANT CHANGE UP (ref 200–360)
TROPONIN T SERPL-MCNC: <0.01 NG/ML — SIGNIFICANT CHANGE UP (ref 0–0.01)
UIBC SERPL-MCNC: 237 UG/DL — SIGNIFICANT CHANGE UP (ref 110–370)
VIT B12 SERPL-MCNC: 433 PG/ML — SIGNIFICANT CHANGE UP (ref 232–1245)
WBC # BLD: 5.89 K/UL — SIGNIFICANT CHANGE UP (ref 3.8–10.5)
WBC # FLD AUTO: 5.89 K/UL — SIGNIFICANT CHANGE UP (ref 3.8–10.5)

## 2019-12-24 PROCEDURE — 71250 CT THORAX DX C-: CPT | Mod: 26

## 2019-12-24 PROCEDURE — 70552 MRI BRAIN STEM W/DYE: CPT | Mod: 26

## 2019-12-24 PROCEDURE — 99222 1ST HOSP IP/OBS MODERATE 55: CPT

## 2019-12-24 PROCEDURE — 73610 X-RAY EXAM OF ANKLE: CPT | Mod: 26,RT

## 2019-12-24 PROCEDURE — 73562 X-RAY EXAM OF KNEE 3: CPT | Mod: 26,RT

## 2019-12-24 PROCEDURE — 99233 SBSQ HOSP IP/OBS HIGH 50: CPT | Mod: GC

## 2019-12-24 PROCEDURE — 93010 ELECTROCARDIOGRAM REPORT: CPT

## 2019-12-24 RX ORDER — ENOXAPARIN SODIUM 100 MG/ML
40 INJECTION SUBCUTANEOUS EVERY 24 HOURS
Refills: 0 | Status: DISCONTINUED | OUTPATIENT
Start: 2019-12-24 | End: 2019-12-26

## 2019-12-24 RX ORDER — AMITRIPTYLINE HCL 25 MG
100 TABLET ORAL AT BEDTIME
Refills: 0 | Status: DISCONTINUED | OUTPATIENT
Start: 2019-12-24 | End: 2019-12-26

## 2019-12-24 RX ORDER — METHADONE HYDROCHLORIDE 40 MG/1
80 TABLET ORAL EVERY 24 HOURS
Refills: 0 | Status: DISCONTINUED | OUTPATIENT
Start: 2019-12-24 | End: 2019-12-26

## 2019-12-24 RX ORDER — MAGNESIUM SULFATE 500 MG/ML
4 VIAL (ML) INJECTION ONCE
Refills: 0 | Status: COMPLETED | OUTPATIENT
Start: 2019-12-24 | End: 2019-12-24

## 2019-12-24 RX ORDER — UMECLIDINIUM 62.5 UG/1
1 AEROSOL, POWDER ORAL
Qty: 0 | Refills: 0 | DISCHARGE

## 2019-12-24 RX ORDER — INFLUENZA VIRUS VACCINE 15; 15; 15; 15 UG/.5ML; UG/.5ML; UG/.5ML; UG/.5ML
0.5 SUSPENSION INTRAMUSCULAR ONCE
Refills: 0 | Status: DISCONTINUED | OUTPATIENT
Start: 2019-12-24 | End: 2019-12-26

## 2019-12-24 RX ORDER — AMLODIPINE BESYLATE 2.5 MG/1
1 TABLET ORAL
Qty: 0 | Refills: 0 | DISCHARGE

## 2019-12-24 RX ORDER — SENNA PLUS 8.6 MG/1
1 TABLET ORAL
Qty: 0 | Refills: 0 | DISCHARGE

## 2019-12-24 RX ORDER — GABAPENTIN 400 MG/1
100 CAPSULE ORAL DAILY
Refills: 0 | Status: DISCONTINUED | OUTPATIENT
Start: 2019-12-24 | End: 2019-12-26

## 2019-12-24 RX ORDER — DOCUSATE SODIUM 100 MG
1 CAPSULE ORAL
Qty: 0 | Refills: 0 | DISCHARGE

## 2019-12-24 RX ORDER — GABAPENTIN 400 MG/1
1 CAPSULE ORAL
Qty: 0 | Refills: 0 | DISCHARGE

## 2019-12-24 RX ORDER — TIOTROPIUM BROMIDE 18 UG/1
1 CAPSULE ORAL; RESPIRATORY (INHALATION) DAILY
Refills: 0 | Status: DISCONTINUED | OUTPATIENT
Start: 2019-12-24 | End: 2019-12-26

## 2019-12-24 RX ORDER — IPRATROPIUM/ALBUTEROL SULFATE 18-103MCG
3 AEROSOL WITH ADAPTER (GRAM) INHALATION EVERY 6 HOURS
Refills: 0 | Status: DISCONTINUED | OUTPATIENT
Start: 2019-12-24 | End: 2019-12-26

## 2019-12-24 RX ORDER — FERROUS SULFATE 325(65) MG
325 TABLET ORAL DAILY
Refills: 0 | Status: DISCONTINUED | OUTPATIENT
Start: 2019-12-24 | End: 2019-12-26

## 2019-12-24 RX ORDER — AMLODIPINE BESYLATE 2.5 MG/1
2.5 TABLET ORAL DAILY
Refills: 0 | Status: DISCONTINUED | OUTPATIENT
Start: 2019-12-24 | End: 2019-12-26

## 2019-12-24 RX ORDER — FOLIC ACID 0.8 MG
1 TABLET ORAL EVERY 24 HOURS
Refills: 0 | Status: DISCONTINUED | OUTPATIENT
Start: 2019-12-24 | End: 2019-12-26

## 2019-12-24 RX ORDER — AMITRIPTYLINE HCL 25 MG
1 TABLET ORAL
Qty: 0 | Refills: 0 | DISCHARGE

## 2019-12-24 RX ORDER — POTASSIUM CHLORIDE 20 MEQ
40 PACKET (EA) ORAL ONCE
Refills: 0 | Status: COMPLETED | OUTPATIENT
Start: 2019-12-24 | End: 2019-12-24

## 2019-12-24 RX ORDER — TIOTROPIUM BROMIDE 18 UG/1
1 CAPSULE ORAL; RESPIRATORY (INHALATION)
Qty: 0 | Refills: 0 | DISCHARGE

## 2019-12-24 RX ORDER — ACETAMINOPHEN 500 MG
650 TABLET ORAL EVERY 6 HOURS
Refills: 0 | Status: DISCONTINUED | OUTPATIENT
Start: 2019-12-24 | End: 2019-12-26

## 2019-12-24 RX ADMIN — AMLODIPINE BESYLATE 2.5 MILLIGRAM(S): 2.5 TABLET ORAL at 07:36

## 2019-12-24 RX ADMIN — Medication 100 MILLIGRAM(S): at 21:27

## 2019-12-24 RX ADMIN — METHADONE HYDROCHLORIDE 80 MILLIGRAM(S): 40 TABLET ORAL at 13:59

## 2019-12-24 RX ADMIN — ENOXAPARIN SODIUM 40 MILLIGRAM(S): 100 INJECTION SUBCUTANEOUS at 07:55

## 2019-12-24 RX ADMIN — Medication 40 MILLIEQUIVALENT(S): at 07:36

## 2019-12-24 RX ADMIN — Medication 3 MILLILITER(S): at 12:36

## 2019-12-24 RX ADMIN — Medication 3 MILLILITER(S): at 19:40

## 2019-12-24 RX ADMIN — Medication 0.5 MILLIGRAM(S): at 16:44

## 2019-12-24 RX ADMIN — Medication 100 GRAM(S): at 15:24

## 2019-12-24 RX ADMIN — Medication 325 MILLIGRAM(S): at 19:40

## 2019-12-24 RX ADMIN — Medication 3 MILLILITER(S): at 23:05

## 2019-12-24 RX ADMIN — GABAPENTIN 100 MILLIGRAM(S): 400 CAPSULE ORAL at 12:36

## 2019-12-24 RX ADMIN — TIOTROPIUM BROMIDE 1 CAPSULE(S): 18 CAPSULE ORAL; RESPIRATORY (INHALATION) at 13:59

## 2019-12-24 RX ADMIN — Medication 3 MILLILITER(S): at 07:36

## 2019-12-24 RX ADMIN — Medication 1 MILLIGRAM(S): at 10:14

## 2019-12-24 RX ADMIN — Medication 0.5 MILLIGRAM(S): at 19:16

## 2019-12-24 NOTE — PROGRESS NOTE ADULT - PROBLEM SELECTOR PLAN 8
Has bottles for Methadone 80mg from Dr. Gómez [address]    -ordered methadone 80 mg   -ctm qtc - 464 12/24

## 2019-12-24 NOTE — PROGRESS NOTE ADULT - PROBLEM SELECTOR PLAN 1
Pt BIBEMS after being found down in street by methadone clinic, c/o progressive weakness since d/c from [place]. no weakness appreciated on neuro exam. CTH with possible vasogenic edema, concern for metastatic disease.  -ordered MRI head   -d/c lumbar spine as neuro exam wnl  -F/u RPR (denies hx)  -PT consult

## 2019-12-24 NOTE — PHYSICAL THERAPY INITIAL EVALUATION ADULT - PERTINENT HX OF CURRENT PROBLEM, REHAB EVAL
Pt. is a 64 y.o male presenting with c/o bilat LE weakness/buckling when ambulating , resulting in loss of balance, unsteadiness.

## 2019-12-24 NOTE — PROGRESS NOTE ADULT - PROBLEM SELECTOR PLAN 6
#PAD  States had "stent" in LLE, was on Plavix for stent, then stopped when had large volume epistaxis. Also left big toe amputation s/p OM. A1c 5.6   -will hold off on vascular consult until MRI and PT eval    Addendum: absent DP pulses on exam w/ slightly cool extremities, consult vascular in AM

## 2019-12-24 NOTE — PROGRESS NOTE ADULT - PROBLEM SELECTOR PLAN 3
Presents s/p LIZY lobectomy, told by his surgeon Dr Padma Irby at [facility] "cancer is all gone"   -Dr Irby collateral [phone]

## 2019-12-24 NOTE — PROGRESS NOTE ADULT - SUBJECTIVE AND OBJECTIVE BOX
OVERNIGHT EVENTS: No acute events    SUBJECTIVE / INTERVAL HPI: Patient seen and examined at bedside. Pt states that falls started yesterday and describes it as due to his knees getting weak causing him to fall. Pt reports b/l leg pain. Otherwise denied dizziness, n/v, abd pain, dyspnea, chest pain, changes in vision.     VITAL SIGNS:  Vital Signs Last 24 Hrs  T(C): 36.7 (24 Dec 2019 09:25), Max: 36.8 (24 Dec 2019 00:46)  T(F): 98.1 (24 Dec 2019 09:25), Max: 98.2 (24 Dec 2019 00:46)  HR: 94 (24 Dec 2019 09:25) (91 - 108)  BP: 144/77 (24 Dec 2019 09:25) (108/69 - 166/83)  BP(mean): --  RR: 16 (24 Dec 2019 09:25) (14 - 19)  SpO2: 100% (24 Dec 2019 09:25) (77% - 100%)    PHYSICAL EXAM:    General: WDWN; Patient is in NAD  HEENT: NC/AT; PERRL, anicteric sclera; MMM  Neck: supple  Cardiovascular: +S1/S2, RRR  Respiratory: CTA B/L; no W/R/R  Gastrointestinal: soft, NT/ND; +BS  Extremities: WWP; no edema present  Vascular: 2+ radial pulses B/L  Neurological: AAOx3; no focal deficits; CN II-XII intact; 5/5 strength in upper and lower extremities; sensation intact to light touch    MEDICATIONS:  MEDICATIONS  (STANDING):  albuterol/ipratropium for Nebulization 3 milliLiter(s) Nebulizer every 6 hours  amitriptyline 100 milliGRAM(s) Oral at bedtime  amLODIPine   Tablet 2.5 milliGRAM(s) Oral daily  enoxaparin Injectable 40 milliGRAM(s) SubCutaneous every 24 hours  folic acid 1 milliGRAM(s) Oral every 24 hours  gabapentin 100 milliGRAM(s) Oral daily  influenza   Vaccine 0.5 milliLiter(s) IntraMuscular once  magnesium sulfate  IVPB 4 Gram(s) IV Intermittent once  methadone    Tablet 80 milliGRAM(s) Oral every 24 hours  tiotropium 18 MICROgram(s) Capsule 1 Capsule(s) Inhalation daily    MEDICATIONS  (PRN):  acetaminophen   Tablet .. 650 milliGRAM(s) Oral every 6 hours PRN Mild Pain (1 - 3)      ALLERGIES:  Allergies    Talwin Compound (Anaphylaxis)    Intolerances        LABS:                        9.2    5.89  )-----------( 297      ( 24 Dec 2019 10:16 )             30.6     12-24    141  |  106  |  11  ----------------------------<  115<H>  3.9   |  22  |  0.89    Ca    8.5      24 Dec 2019 10:16  Mg     1.5     12-24    TPro  7.4  /  Alb  3.1<L>  /  TBili  0.2  /  DBili  x   /  AST  12  /  ALT  6<L>  /  AlkPhos  81  12-24        CAPILLARY BLOOD GLUCOSE      POCT Blood Glucose.: 132 mg/dL (23 Dec 2019 15:45)      RADIOLOGY & ADDITIONAL TESTS: Reviewed. OVERNIGHT EVENTS: No acute events    SUBJECTIVE / INTERVAL HPI: Patient seen and examined at bedside. Pt states that falls started yesterday and describes it as his knees getting weak causing him to drop to the floor. Otherwise he reports b/l leg pain. Otherwise denied dizziness, n/v, abd pain, dyspnea, chest pain, changes in vision.     VITAL SIGNS:  Vital Signs Last 24 Hrs  T(C): 36.7 (24 Dec 2019 09:25), Max: 36.8 (24 Dec 2019 00:46)  T(F): 98.1 (24 Dec 2019 09:25), Max: 98.2 (24 Dec 2019 00:46)  HR: 94 (24 Dec 2019 09:25) (91 - 108)  BP: 144/77 (24 Dec 2019 09:25) (108/69 - 166/83)  BP(mean): --  RR: 16 (24 Dec 2019 09:25) (14 - 19)  SpO2: 100% (24 Dec 2019 09:25) (77% - 100%)    PHYSICAL EXAM:  General: unkempt male in NAD  HEENT: NC/AT; PERRL, anicteric sclera; dry MM  Neck: supple  Cardiovascular: +S1/S2, RRR  Respiratory: CTA B/L; no W/R/R  Gastrointestinal: soft, NT/ND; +BS  Extremities: WWP; no edema present  Vascular: 2+ radial pulses B/L  Neurological: AAOx3; CN II-XII intact; 5/5 strength in upper and lower extremities; sensation intact to light touch; pt unsteady when attempting to get up    MEDICATIONS:  MEDICATIONS  (STANDING):  albuterol/ipratropium for Nebulization 3 milliLiter(s) Nebulizer every 6 hours  amitriptyline 100 milliGRAM(s) Oral at bedtime  amLODIPine   Tablet 2.5 milliGRAM(s) Oral daily  enoxaparin Injectable 40 milliGRAM(s) SubCutaneous every 24 hours  folic acid 1 milliGRAM(s) Oral every 24 hours  gabapentin 100 milliGRAM(s) Oral daily  influenza   Vaccine 0.5 milliLiter(s) IntraMuscular once  magnesium sulfate  IVPB 4 Gram(s) IV Intermittent once  methadone    Tablet 80 milliGRAM(s) Oral every 24 hours  tiotropium 18 MICROgram(s) Capsule 1 Capsule(s) Inhalation daily    MEDICATIONS  (PRN):  acetaminophen   Tablet .. 650 milliGRAM(s) Oral every 6 hours PRN Mild Pain (1 - 3)      ALLERGIES:  Allergies    Talwin Compound (Anaphylaxis)    Intolerances        LABS:                        9.2    5.89  )-----------( 297      ( 24 Dec 2019 10:16 )             30.6     12-24    141  |  106  |  11  ----------------------------<  115<H>  3.9   |  22  |  0.89    Ca    8.5      24 Dec 2019 10:16  Mg     1.5     12-24    TPro  7.4  /  Alb  3.1<L>  /  TBili  0.2  /  DBili  x   /  AST  12  /  ALT  6<L>  /  AlkPhos  81  12-24        CAPILLARY BLOOD GLUCOSE      POCT Blood Glucose.: 132 mg/dL (23 Dec 2019 15:45)      RADIOLOGY & ADDITIONAL TESTS: Reviewed. OVERNIGHT EVENTS: No acute events    SUBJECTIVE / INTERVAL HPI: Patient seen and examined at bedside. Pt states that falls started yesterday and describes it as his knees getting weak causing him to drop to the floor. Otherwise he reports b/l leg pain. Otherwise denied dizziness, n/v, abd pain, dyspnea, chest pain, changes in vision. 12 pt ROS is otherwise negative    VITAL SIGNS:  Vital Signs Last 24 Hrs  T(C): 36.7 (24 Dec 2019 09:25), Max: 36.8 (24 Dec 2019 00:46)  T(F): 98.1 (24 Dec 2019 09:25), Max: 98.2 (24 Dec 2019 00:46)  HR: 94 (24 Dec 2019 09:25) (91 - 108)  BP: 144/77 (24 Dec 2019 09:25) (108/69 - 166/83)  BP(mean): --  RR: 16 (24 Dec 2019 09:25) (14 - 19)  SpO2: 100% (24 Dec 2019 09:25) (77% - 100%)    PHYSICAL EXAM:  General: unkempt male in NAD  HEENT: NC/AT; PERRL, anicteric sclera; dry MM  Neck: supple  Cardiovascular: +S1/S2, RRR  Respiratory: CTA B/L; no W/R/R  Gastrointestinal: soft, NT/ND; +BS  Extremities: WWP; no edema present  Vascular: 2+ radial pulses B/L  Neurological: AAOx3; CN II-XII intact; 5/5 strength in upper and lower extremities; sensation intact to light touch; pt unsteady when attempting to get up  MSK: R knee with minimal swelling, full ROM of all joints  Skin: no rash  Psych: normal affect  Back: midline    MEDICATIONS:  MEDICATIONS  (STANDING):  albuterol/ipratropium for Nebulization 3 milliLiter(s) Nebulizer every 6 hours  amitriptyline 100 milliGRAM(s) Oral at bedtime  amLODIPine   Tablet 2.5 milliGRAM(s) Oral daily  enoxaparin Injectable 40 milliGRAM(s) SubCutaneous every 24 hours  folic acid 1 milliGRAM(s) Oral every 24 hours  gabapentin 100 milliGRAM(s) Oral daily  influenza   Vaccine 0.5 milliLiter(s) IntraMuscular once  magnesium sulfate  IVPB 4 Gram(s) IV Intermittent once  methadone    Tablet 80 milliGRAM(s) Oral every 24 hours  tiotropium 18 MICROgram(s) Capsule 1 Capsule(s) Inhalation daily    MEDICATIONS  (PRN):  acetaminophen   Tablet .. 650 milliGRAM(s) Oral every 6 hours PRN Mild Pain (1 - 3)      ALLERGIES:  Allergies    Talwin Compound (Anaphylaxis)    Intolerances        LABS:                        9.2    5.89  )-----------( 297      ( 24 Dec 2019 10:16 )             30.6     12-24    141  |  106  |  11  ----------------------------<  115<H>  3.9   |  22  |  0.89    Ca    8.5      24 Dec 2019 10:16  Mg     1.5     12-24    TPro  7.4  /  Alb  3.1<L>  /  TBili  0.2  /  DBili  x   /  AST  12  /  ALT  6<L>  /  AlkPhos  81  12-24        CAPILLARY BLOOD GLUCOSE      POCT Blood Glucose.: 132 mg/dL (23 Dec 2019 15:45)      RADIOLOGY & ADDITIONAL TESTS: Reviewed. OVERNIGHT EVENTS: No acute events    SUBJECTIVE / INTERVAL HPI: Patient seen and examined at bedside. Pt states that falls started yesterday and describes it as his knees getting weak causing him to drop to the floor. Otherwise he reports b/l leg pain. Otherwise denied dizziness, n/v, abd pain, dyspnea, chest pain, changes in vision. 12 pt ROS is otherwise negative  COLLATERAL - Pt had a 2.1 cm LIZY mass with positive paratracheal lymph nodes on screening CT in September. Pt underwent flexible bronchoscopy with FNA of LIZY mass which was possible for squamous cell carcinoma. During admission, pt underwent LIZY wedge resection. His hospital course was c/b hyponatremia which responded to salt tabs and epistaxis with loss of 250 cc of blood. ENT packed and sent home on augmentin for packing. Of note, pt has PVD with aortic femoral bypass graft. During admission PT saw pt and rec home. He was able to walk 200 ft with mildly unsteady gait as per assessment.    VITAL SIGNS:  Vital Signs Last 24 Hrs  T(C): 36.7 (24 Dec 2019 09:25), Max: 36.8 (24 Dec 2019 00:46)  T(F): 98.1 (24 Dec 2019 09:25), Max: 98.2 (24 Dec 2019 00:46)  HR: 94 (24 Dec 2019 09:25) (91 - 108)  BP: 144/77 (24 Dec 2019 09:25) (108/69 - 166/83)  BP(mean): --  RR: 16 (24 Dec 2019 09:25) (14 - 19)  SpO2: 100% (24 Dec 2019 09:25) (77% - 100%)    PHYSICAL EXAM:  General: unkempt male in NAD  HEENT: NC/AT; PERRL, anicteric sclera; dry MM  Neck: supple  Cardiovascular: +S1/S2, RRR  Respiratory: CTA B/L; no W/R/R  Gastrointestinal: soft, NT/ND; +BS  Extremities: WWP; no edema present  Vascular: 2+ radial pulses B/L  Neurological: AAOx3; CN II-XII intact; 5/5 strength in upper and lower extremities; sensation intact to light touch; pt unsteady when attempting to get up  MSK: R knee with minimal swelling, full ROM of all joints  Skin: no rash  Psych: normal affect  Back: midline    MEDICATIONS:  MEDICATIONS  (STANDING):  albuterol/ipratropium for Nebulization 3 milliLiter(s) Nebulizer every 6 hours  amitriptyline 100 milliGRAM(s) Oral at bedtime  amLODIPine   Tablet 2.5 milliGRAM(s) Oral daily  enoxaparin Injectable 40 milliGRAM(s) SubCutaneous every 24 hours  folic acid 1 milliGRAM(s) Oral every 24 hours  gabapentin 100 milliGRAM(s) Oral daily  influenza   Vaccine 0.5 milliLiter(s) IntraMuscular once  magnesium sulfate  IVPB 4 Gram(s) IV Intermittent once  methadone    Tablet 80 milliGRAM(s) Oral every 24 hours  tiotropium 18 MICROgram(s) Capsule 1 Capsule(s) Inhalation daily    MEDICATIONS  (PRN):  acetaminophen   Tablet .. 650 milliGRAM(s) Oral every 6 hours PRN Mild Pain (1 - 3)      ALLERGIES:  Allergies    Talwin Compound (Anaphylaxis)    Intolerances        LABS:                        9.2    5.89  )-----------( 297      ( 24 Dec 2019 10:16 )             30.6     12-24    141  |  106  |  11  ----------------------------<  115<H>  3.9   |  22  |  0.89    Ca    8.5      24 Dec 2019 10:16  Mg     1.5     12-24    TPro  7.4  /  Alb  3.1<L>  /  TBili  0.2  /  DBili  x   /  AST  12  /  ALT  6<L>  /  AlkPhos  81  12-24        CAPILLARY BLOOD GLUCOSE      POCT Blood Glucose.: 132 mg/dL (23 Dec 2019 15:45)      RADIOLOGY & ADDITIONAL TESTS: Reviewed.

## 2019-12-24 NOTE — PHYSICAL THERAPY INITIAL EVALUATION ADULT - GAIT DEVIATIONS NOTED, PT EVAL
decreased rosa/decreased weight-shifting ability/decreased step length/increased time in double stance

## 2019-12-24 NOTE — PROGRESS NOTE ADULT - PROBLEM SELECTOR PLAN 7
Hb 9.2, Pt describes complicated recent course at [facility] with large volume epistaxis   -F/u iron studies

## 2019-12-24 NOTE — PROGRESS NOTE ADULT - ASSESSMENT
64M PMH lung cancer s/p lobectomy (one month ago at [facility] c/b PNA, SBO(?), large volume epistasis) PAD with left stenting, osteomyelitis of left big toe s/p amputation, COPD/emphysema, heroin use now on methadone presents with progressive LE weakness has been "collapsing" since he was discharged from [place], saying legs just "give out".

## 2019-12-24 NOTE — PHYSICAL THERAPY INITIAL EVALUATION ADULT - ADDITIONAL COMMENTS
Pt. reports living in an elevator building, denies prior use of assistive device. Pt. is reluctant to use a RW as recommended by PT.

## 2019-12-24 NOTE — PROGRESS NOTE ADULT - PROBLEM SELECTOR PLAN 2
2/2 Methadone use and ativan for MRI pretreatment. Now resolved. Pt AOx3, however is poor historian    #Emphysema/COPD  Severe confluent bullous emphysematous changes in the bilateral upper lung zones.Dilation the main pulmonary artery measuring up to 4.2 cm. Area of segmental atelectasis in the right lower lobe. No obvious metastasis are seen. On incruse ellipta  C/w Spiriva and Albuterol PRN

## 2019-12-25 LAB
ANION GAP SERPL CALC-SCNC: 10 MMOL/L — SIGNIFICANT CHANGE UP (ref 5–17)
BUN SERPL-MCNC: 9 MG/DL — SIGNIFICANT CHANGE UP (ref 7–23)
CALCIUM SERPL-MCNC: 7.8 MG/DL — LOW (ref 8.4–10.5)
CHLORIDE SERPL-SCNC: 104 MMOL/L — SIGNIFICANT CHANGE UP (ref 96–108)
CO2 SERPL-SCNC: 25 MMOL/L — SIGNIFICANT CHANGE UP (ref 22–31)
CREAT SERPL-MCNC: 0.84 MG/DL — SIGNIFICANT CHANGE UP (ref 0.5–1.3)
GLUCOSE SERPL-MCNC: 94 MG/DL — SIGNIFICANT CHANGE UP (ref 70–99)
HCT VFR BLD CALC: 26.3 % — LOW (ref 39–50)
HGB BLD-MCNC: 8.2 G/DL — LOW (ref 13–17)
MAGNESIUM SERPL-MCNC: 2.2 MG/DL — SIGNIFICANT CHANGE UP (ref 1.6–2.6)
MCHC RBC-ENTMCNC: 26.9 PG — LOW (ref 27–34)
MCHC RBC-ENTMCNC: 31.2 GM/DL — LOW (ref 32–36)
MCV RBC AUTO: 86.2 FL — SIGNIFICANT CHANGE UP (ref 80–100)
NRBC # BLD: 0 /100 WBCS — SIGNIFICANT CHANGE UP (ref 0–0)
PLATELET # BLD AUTO: 278 K/UL — SIGNIFICANT CHANGE UP (ref 150–400)
POTASSIUM SERPL-MCNC: 3.6 MMOL/L — SIGNIFICANT CHANGE UP (ref 3.5–5.3)
POTASSIUM SERPL-SCNC: 3.6 MMOL/L — SIGNIFICANT CHANGE UP (ref 3.5–5.3)
RBC # BLD: 3.05 M/UL — LOW (ref 4.2–5.8)
RBC # FLD: 15 % — HIGH (ref 10.3–14.5)
SODIUM SERPL-SCNC: 139 MMOL/L — SIGNIFICANT CHANGE UP (ref 135–145)
WBC # BLD: 6.13 K/UL — SIGNIFICANT CHANGE UP (ref 3.8–10.5)
WBC # FLD AUTO: 6.13 K/UL — SIGNIFICANT CHANGE UP (ref 3.8–10.5)

## 2019-12-25 PROCEDURE — 99232 SBSQ HOSP IP/OBS MODERATE 35: CPT | Mod: GC

## 2019-12-25 RX ORDER — POTASSIUM CHLORIDE 20 MEQ
40 PACKET (EA) ORAL ONCE
Refills: 0 | Status: COMPLETED | OUTPATIENT
Start: 2019-12-25 | End: 2019-12-25

## 2019-12-25 RX ADMIN — Medication 650 MILLIGRAM(S): at 22:28

## 2019-12-25 RX ADMIN — Medication 100 MILLIGRAM(S): at 21:28

## 2019-12-25 RX ADMIN — Medication 3 MILLILITER(S): at 06:17

## 2019-12-25 RX ADMIN — Medication 3 MILLILITER(S): at 17:10

## 2019-12-25 RX ADMIN — Medication 3 MILLILITER(S): at 23:34

## 2019-12-25 RX ADMIN — Medication 40 MILLIEQUIVALENT(S): at 12:13

## 2019-12-25 RX ADMIN — TIOTROPIUM BROMIDE 1 CAPSULE(S): 18 CAPSULE ORAL; RESPIRATORY (INHALATION) at 12:14

## 2019-12-25 RX ADMIN — METHADONE HYDROCHLORIDE 80 MILLIGRAM(S): 40 TABLET ORAL at 12:14

## 2019-12-25 RX ADMIN — AMLODIPINE BESYLATE 2.5 MILLIGRAM(S): 2.5 TABLET ORAL at 06:17

## 2019-12-25 RX ADMIN — Medication 3 MILLILITER(S): at 12:14

## 2019-12-25 RX ADMIN — GABAPENTIN 100 MILLIGRAM(S): 400 CAPSULE ORAL at 12:14

## 2019-12-25 RX ADMIN — Medication 325 MILLIGRAM(S): at 12:13

## 2019-12-25 RX ADMIN — ENOXAPARIN SODIUM 40 MILLIGRAM(S): 100 INJECTION SUBCUTANEOUS at 06:17

## 2019-12-25 RX ADMIN — Medication 650 MILLIGRAM(S): at 21:28

## 2019-12-25 RX ADMIN — Medication 1 MILLIGRAM(S): at 12:13

## 2019-12-25 NOTE — DISCHARGE NOTE PROVIDER - NSDCFUADDAPPT_GEN_ALL_CORE_FT
Please follow up with Dr. Mata Rebolledo once you are discharged. An appointment was made for you on Monday, Dec 30th at 3:45 pm. His office is located at [address] [place] [address]    Please also follow up with Dr. Padma Irby.    Please also follow up with your primary care doctor 2 weeks after discharge.

## 2019-12-25 NOTE — PROGRESS NOTE ADULT - PROBLEM SELECTOR PLAN 3
Presents s/p LIZY lobectomy, told by his surgeon Dr Padma Irby at [facility] "cancer is all gone"   -Dr Irby collateral [phone]

## 2019-12-25 NOTE — PROGRESS NOTE ADULT - PROBLEM/PLAN-4
Patient: Kobi Malik Date: 2017   : 1977    39 year old male      OUTPATIENT WOUND CARE PROGRESS NOTE    Supervising Wound Care/Hyperbaric Medicine Physician: Not Applicable  Consulting Provider:  CHUY Schultz  Date of Consultation/Last Comprehensive Exam:  16  Referring  Provider:  Dr. Redding    SUBJECTIVE:    Chief Complaint:  Right great toe wound       Wound/Ulcer Present:    Diabetic lower extremity ulcer:  Dobbs grade 1 (superficial diabetic ulcer).    Diabetic foot exam performed?  No.     Current Vascular Assessment:  Physical exam.     Current Antibiotic Regimen:  None.     Current Offloading Modality:  casting .    Additional Wound Category:  Not applicable     Maximum Baseline Ambulatory Status:  Ambulates unassisted     History of Present Illness:  This is a 39 year old diabetic, obese male who is being seen by the wound care clinic for a right great toe wound that started in mid 2016. He was wearing a new pair of shoes and went hiking. He was seen at a walk in clinic initially and treated with antibiotics. He is a non-smoker.      He completed 12 weeks of IV antibiotics for osteomyelitis per Dr. Lazaro on 17.     He has been following with Dr. Mims in endocrine regarding his uncontrolled diabetes.  He is s/p bone biopsy per Dr. Faust 17-no acute inflammation.     BLE angiogram by Dr. Tenorio on 17 without significant inflow or outflow stenosis. He is scheduled for vein mapping 17.    Presents today for cast change. He reports some slipping in his cast but no pain. States his blood glucose levels have been 130-140.     Current Treatment Regimen:  Dressing:  Drawtex EZ cast   Frequency:  Weekly   Changed by:  Hyperbaric/Wound Care provider    Review of Systems:  Pertinent items are noted in HPI (history of present illness).    Past Medical History:   Diagnosis Date   • Anxiety    • Depression with anxiety    • Depressive disorder    • Diabetes  mellitus (CMS/formerly [facility])    • Diabetes mellitus type 2 with complications, uncontrolled (CMS/formerly [facility]) 2/27/2017   • Diabetes type 2, controlled (CMS/formerly [facility]) 7/8/2016   • Diabetic polyneuropathy associated with type 2 diabetes mellitus (CMS/formerly [facility]) 2/27/2017   • DM (diabetes mellitus), type 2, uncontrolled (CMS/formerly [facility]) 3/29/2016   • Dyslipidemia    • Overweight    • Varicose veins of leg with pain 4/25/2017   • Vitamin D deficiency 6/13/2017   • Weight gain 7/8/2016     Past Surgical History:   Procedure Laterality Date   • CYST REMOVAL  2006    [place]   • KNEE ARTHROSCOPY W/ MENISCAL REPAIR Left 9.10.15    Left Knee Arthroscopy. Medial Meniscal Root Repair. Possible Posterior Cruciate Ligament Repair. Dr. Dunaway @ [facility]   • VASECTOMY       Social History     Social History   • Marital status:      Spouse name: N/A   • Number of children: N/A   • Years of education: N/A     Occupational History   • Not on file.     Social History Main Topics   • Smoking status: Never Smoker   • Smokeless tobacco: Never Used   • Alcohol use 0.0 oz/week      Comment: Rarely   • Drug use: No   • Sexual activity: Not on file     Other Topics Concern   • Not on file     Social History Narrative   • No narrative on file     Family History   Problem Relation Age of Onset   • Hearing loss Mother    • High cholesterol Mother    • Heart disease Father    • High cholesterol Father    • High blood pressure Father    • Arthritis Maternal Grandmother    • Depression Maternal Grandmother    • Diabetes Maternal Grandmother    • Early death Maternal Grandfather      64 yrs from a heart attack   • Arthritis Paternal Grandmother    • Cancer Paternal Grandmother      breast   • Depression Paternal Grandmother    • Diabetes Paternal Grandmother    • Cancer Paternal Grandfather      colon and prostate   • Heart disease Paternal Grandfather    • Stroke Paternal Grandfather        Current Outpatient Prescriptions   Medication Sig   • BYDUREON 2 MG pen-injector INJECT  2MG UNDER THE SKIN ONCE A WEEK   • LEVEMIR FLEXTOUCH 100 UNIT/ML pen-injector INJECT 120 UNITS UNDER THE SKIN NIGHTLY   • insulin detemir (LEVEMIR FLEXTOUCH) 100 UNIT/ML pen-injector Inject 120 units nightly   • insulin lispro (HUMALOG KWIKPEN) 100 UNIT/ML pen-injector 38-34-38: addtl 5units for every 50pts>150;. HS scale 10 units for 201-250;2 units for every 50pts>250. Max of 150 U total/day.   • metFORMIN (GLUCOPHAGE) 500 MG tablet Take 2 tablets by mouth 2 times daily.   • empagliflozin (JARDIANCE) 25 MG tablet Take 1 tablet by mouth daily (before breakfast).   • benazepril (LOTENSIN) 20 MG tablet TAKE ONE TABLET BY MOUTH DAILY   • simvastatin (ZOCOR) 40 MG tablet TAKE 1 TABLET BY MOUTH AT BEDTIME   • aspirin 81 MG tablet Take 1 tablet by mouth daily.   • clopidogrel (PLAVIX) 75 MG tablet Take 1 tablet by mouth daily.   • PARoxetine (PAXIL) 40 MG tablet Take 1 tablet by mouth daily.   • Insulin Pen Needle (BD PEN NEEDLE SUNNY U/F) 32G X 4 MM Misc Use up to 5 times per day to inject insulins. DX: E11.9   • sharps container For insulin pen needles, lancets, and bydureon   • blood glucose test strips (FREESTYLE LITE) Use to test blood sugar 4 times per day. DX: E11.9   • sildenafil (VIAGRA) 100 MG tablet Take 1 tablet by mouth as needed for Erectile Dysfunction.   • Blood Glucose Monitoring Suppl (FREESTYLE LITE) JAEL Test up to 4 x/day     No current facility-administered medications for this encounter.         ALLERGIES: no known allergies.    OBJECTIVE:  Vital Signs:    /78 (BP Location: [facility], Patient Position: Sitting)   Pulse 89   Temp 97 °F (36.1 °C) (Temporal Artery)   Resp 18       Physical Exam:  General appearance: Alert, obese, oriented to person, place, time and situation, in no distress and cooperative  Head:   normocephalic without obvious abnormality  Mouth:   moist  Pulmonary: normal respiratory effort     Right lower extremity with trace edema. Scattered varicosities and hemosiderin  staining.     DP/PT pulses are palpable.     Right plantar hallux with a full thickness ulceration with granular base. No tunneling, undermining, or probe to bone.     Right medial great toe with loose sutures embedded in lifted eschar that was removed. 2 areas of depth, granular tissue is visible.     Wound Bed Quality:  Granulation tissue and Fibrin      Annette-wound Quality:    Callus    Additional Descriptors:  none    Wound Measurements Per Flowsheet:     Wound Toe, great Right Dorsal -Wound Length (cm): 1 cm  Wound Toe, great Right-Wound Length (cm): 0.3 cm  Wound Toe, great Right Dorsal -Wound Width (cm): 0.2 cm  Wound Toe, great Right-Wound Width (cm): 0.2 cm  Wound Toe, great Right Dorsal -Depth (cm): 0.2 cm  Wound Toe, great Right-Depth (cm): 0.5 cm                                                       PROCEDURE:      Total Contact Cast - EZ    Extremity: Right lower extremity.    Patient preparation: After informed consent obtained, foam dressing was applied to the ulcer/wound area and secured with paper tape, and stockinet was placed over the entire foot extending to knee.  Protective felt padding was placed so the circular flaps extended over the malleoli with shorter narrower portion of the felt padding towards the knee. This was secured in placed with plastic tape along the shin and malleoli.  The remaining protective felt padding was loosely wrapped to cover toes and plantar surface of the foot leaving a finger's width space beyond longest toe.  The felt padding was secured in place with plastic tape at dorsum of foot under arch and behind heel with excess padding cut to allow proximal 1-3 inches of padding beyond heel. Corners were trimmed of the heel for optimal cast contact.  The thick white sleeve was rolled over toes extending toward knee leaving 2 inches of stockinet exposed. There was 2-4 inches of excess sleeve beyond the toes this was folded over the dorsum of foot and secured in place with  plastic tape.    Casting:  Water temperature measured from 70-75° was used and cast sock was submerged for 5 full seconds. The cast sock was then applied and rolled in extended beyond toes by approximately 2-3 inches. The cast sock was enrolled towards the knee and the proximal edge of stockinet distally was folded to cover all loose edges of the cast sock.  Patient was placed in 90° neutral position maintained for 2-3 minutes to allow the cast a firm enough and patient was placed in continue neutral position for approximately 15 minutes to allow cast to cool and harden. Patient was placed in the outer boot, straps were secured and patient was discharged.     Patient tolerated the procedure well.  The patient was provided with an instruction sheet and an emergency removal card, with instructions to present to the ED for removal of the cast if any difficulty after clinic hours.       Complications:  None.    Casting performed by NP.        Laboratory assessments reviewed:    No results found for: PAB   Albumin (g/dL)   Date Value   06/12/2017 3.8   06/05/2017 3.7   05/30/2017 3.7      No results available in last 24 hours    Lab Results   Component Value Date    WBC 5.0 06/12/2017    GLUCOSE 167 (H) 07/20/2017    HGBA1C 7.3 06/13/2017    CRP 0.7 06/12/2017    RESR 18 (H) 06/12/2017    CREATININE 0.79 07/20/2017    GFRA >90 07/20/2017    GFRNA >90 07/20/2017        Culture results:  Specimen Description (no units)   Date Value   07/26/2017 WOUND RIGHT GREAT TOE   07/26/2017 WOUND TOE, RIGHT GREAT   07/26/2017 WOUND R GREAT TOE SWAB   06/28/2017 WOUND, DEEP TOE RIGHT PLANTAR GREAT TOE    CULTURE (no units)   Date Value   07/26/2017 RARE LEUCONOSTOC MESENTEROIDES SPECIES CREMORIS (P)   07/26/2017 NO ANAEROBES ISOLATED   07/26/2017 NO GROWTH 15 DAYS.   07/26/2017 NO GROWTH 12 DAYS.        Diagnostic Assessments Reviewed:  X -ray (s) , MRI  and Vascular Studies:  Angiogram       Pathology Report         Client: [facility]  Formerly named [facility]         Submitting Physician: Allison Faust DPM         Additional Physician(s): Jimbo Acuna MD         Date Specimen Collected: 07/26/17           Accession #:  [id number]     Date Specimen Received:  07/26/17           Requisition     #:[id number]     Date Reported:           7/28/2017 13:57   Location:     [facility]         ______________________________________________________________________________     Pathologic Diagnosis :     Bone, right great toe, biopsy:     - No active inflammation identified.         Grady Oviedo MD     ** Electronic Signature (MPW) 7/28/2017 13:57 **     ______________________________________________________________________________     BLE angiogram 7/20/17  FINDINGS:  Infrarenal abdominal aorta, bilateral renal arteries, bilateral iliac arteries are patent. Patent bilateral superficial femoral, common femoral,  profunda femoris, popliteal arteries with three-vessel runoff noted to the level of the ankles. No significant stenoses identified.    IMPRESSION: No significant inflow or outflow stenoses noted.     Right foot x-ray 7/17/17  FINDINGS: There is a bipartite medial sesamoid bone. No fracture, dislocation, nor bone destruction is seen. There is an ulcer in the plantar  portion of the great toe.      IMPRESSION: Soft tissue ulcer without bony abnormality.  Right foot MRI 3/15/17  IMPRESSION:  1. Osteomyelitis involving the distal phalanx of the right great toe.  2. Cellulitis and ulcer formation of the right great toe.    Right foot x-ray 2/8/17   IMPRESSION: Soft tissue swelling and degenerative change, but with no definite findings of osteomyelitis.    Nutritional Assessment:  Prealbumin and/or Albumin reviewed    Wound treatment goals are palliative:  No    DIAGNOSES:  Diabetic lower extremity ulcer, Dobbs grade 3 (deep ulcer with abscess or osteomyelitis) Right plantar great toe  Non-healing surgical wound Right medial great  toe s/p bone biopsy 7/26/17  Venous insufficiency, chronic BLE  Diabetes A1C 7.3% on 6/13/17    Medical Decision Making:   Right hallux wound present since July 2016. Patient's right foot MRI on 3/15/17 was positive for osteomyelitis in his right great toe. He received 12 weeks of IV antibiotics per Dr. Lazaro completing on 6/19/17.     Hallux wound is stable with granular base and minimal drainage. Apply Natalie followed by Aquacel.     Right distal phalanx bone biopsy performed 7/26/17 by Dr. Faust - no acute inflammation identified. Biopsy site with loose eschar and embedded sutures that were loose and no longer holding in the wound base and easily pulled away. Areas of depth will be packed with natalie.     EZ cast re-applied today. Pt reports loosening and slipping on previous cast, the padding was folded down towards the plantar foot with last cast. Will switch to fold over dorsal foot and see if this helps with slipping. Due to patient's size and weight he we will plan to switch to cutimed cast. He is going on vacation the week after next and will likely take the week off casting.     Encouraged increased oral protein and tight blood glucose control to assist in wound healing.     Follow-up in 1 week. Call early next week if any issues with cast.     Plan of Care:  Advanced Wound Care Recommendations:  See above  Percent Wound Closure from consult: See measurements   Care plan to augment wound closure:  Not applicable.       Patient stable.     CHUY Schultz      DISPLAY PLAN FREE TEXT

## 2019-12-25 NOTE — DISCHARGE NOTE PROVIDER - NSDCCPCAREPLAN_GEN_ALL_CORE_FT
PRINCIPAL DISCHARGE DIAGNOSIS  Diagnosis: Weakness  Assessment and Plan of Treatment: You came into the hospital due to weakness. Imaging of your brain did NOT show any significant stroke or the spread of cancer to the brain. The reason you are having leg weakness and are falling is due to weak muscles. Physical therapy came and saw you and determined that you need rehab to increase the strength of your muscles and increase your mobility.      SECONDARY DISCHARGE DIAGNOSES  Diagnosis: Peripheral arterial disease  Assessment and Plan of Treatment: Peripheral arterial disease PRINCIPAL DISCHARGE DIAGNOSIS  Diagnosis: Weakness  Assessment and Plan of Treatment: You came into the hospital due to weakness. Imaging of your brain did NOT show any significant stroke or the spread of cancer to the brain which can cause weakness by affecting your brain. We also x-rayed your knee and ankle and did not find fracture/dislocation. The reason you are having leg weakness is due to weak muscles. Physical therapy came and saw you and initially recommended rehab, however, the next few times you worked with them, they determined that you were strong enough to go home. Please use your walker to assist you while walking and help with stability. It is important to use the walker to avoid falls which can have serious consequences including fractures, brain bleeds, and severe pain.      SECONDARY DISCHARGE DIAGNOSES  Diagnosis: Peripheral arterial disease  Assessment and Plan of Treatment: Peripheral artery disease is when the blood vessels that go to your legs (arteries) have blockages due to atherosclerosis plaques. These plaques are blockages which prevent blood from going to your leg. This can cause pain and prevent oxygen from going your leg which can damage your legs and cause ulcers. You have a bypass of the blockage which is a surgery that allows blood to take a detour around the blockage to the rest of your leg. You also have a stent which opens up the blockages. For the stent, you must be on a blood thinner to prevent the stent from closing up. After your recent discharge from [facility], they stopped the blood thinner (Plavix) because you had bleeding from your nose. You may need to be on the blood thinner again because your bleeding stopped. Again, to restart the Plavix, please follow up with Dr. Rebolledo once you are discharged. An appointment was made for you on Monday, Dec 30th at 3:45 pm. Please go to the appointment and talk to Dr. Rebolledo about restarting the Plavix for your stent.    Diagnosis: Folate deficiency  Assessment and Plan of Treatment: You are deficient in folate which is a vitamin found in leafy green vegetables. The vitamin was sent to your pharmacy.    Diagnosis: Methadone dependence  Assessment and Plan of Treatment: We continued you on your methadone while admitted. Please go back to methadone clinic to resume your methadone once discharged.    Diagnosis: History of lung cancer  Assessment and Plan of Treatment: You have a history of lung cancer which was surgically removed. There was no spread of the cancer to your brain on the MRI we got during this admission. Please follow up with your surgeon who can monitor you and make sure the cancer does not come back.    Diagnosis: Anemia  Assessment and Plan of Treatment: You have mild iron deficiency anemia. We gave you iron while you were admitted. Please follow up with your primary care doctor who can start iron supplements if needed after checking bloodwork.

## 2019-12-25 NOTE — PROGRESS NOTE ADULT - PROBLEM SELECTOR PLAN 2
2/2 Methadone use and ativan for MRI pretreatment. Now resolved. Pt AOx3, however is poor historian  -satting well on RA    #Emphysema/COPD  Severe confluent bullous emphysematous changes in the bilateral upper lung zones.Dilation the main pulmonary artery measuring up to 4.2 cm. Area of segmental atelectasis in the right lower lobe. No obvious metastasis are seen. On incruse ellipta  -C/w Spiriva and Albuterol PRN

## 2019-12-25 NOTE — PROGRESS NOTE ADULT - SUBJECTIVE AND OBJECTIVE BOX
OVERNIGHT EVENTS: No acute events    SUBJECTIVE / INTERVAL HPI: Patient seen and examined at bedside.   Pt denied     VITAL SIGNS:  Vital Signs Last 24 Hrs  T(C): 36.5 (25 Dec 2019 05:24), Max: 36.7 (24 Dec 2019 09:25)  T(F): 97.7 (25 Dec 2019 05:24), Max: 98.1 (24 Dec 2019 09:25)  HR: 99 (25 Dec 2019 05:24) (94 - 103)  BP: 154/81 (25 Dec 2019 05:24) (121/68 - 158/95)  BP(mean): --  RR: 18 (25 Dec 2019 05:24) (16 - 18)  SpO2: 95% (25 Dec 2019 05:24) (91% - 100%)    PHYSICAL EXAM:    General: WDWN; Patient is in NAD  HEENT: NC/AT; PERRL, anicteric sclera; MMM  Neck: supple  Cardiovascular: +S1/S2, RRR  Respiratory: CTA B/L; no W/R/R  Gastrointestinal: soft, NT/ND; +BS  Extremities: WWP; no edema present  Vascular: 2+ radial pulses B/L  Neurological: AAOx3; no focal deficits; CN II-XII intact; 5/5 strength in upper and lower extremities; sensation intact to light touch    MEDICATIONS:  MEDICATIONS  (STANDING):  albuterol/ipratropium for Nebulization 3 milliLiter(s) Nebulizer every 6 hours  amitriptyline 100 milliGRAM(s) Oral at bedtime  amLODIPine   Tablet 2.5 milliGRAM(s) Oral daily  enoxaparin Injectable 40 milliGRAM(s) SubCutaneous every 24 hours  ferrous    sulfate 325 milliGRAM(s) Oral daily  folic acid 1 milliGRAM(s) Oral every 24 hours  gabapentin 100 milliGRAM(s) Oral daily  influenza   Vaccine 0.5 milliLiter(s) IntraMuscular once  methadone    Tablet 80 milliGRAM(s) Oral every 24 hours  tiotropium 18 MICROgram(s) Capsule 1 Capsule(s) Inhalation daily    MEDICATIONS  (PRN):  acetaminophen   Tablet .. 650 milliGRAM(s) Oral every 6 hours PRN Mild Pain (1 - 3)      ALLERGIES:  Allergies    Talwin Compound (Anaphylaxis)    Intolerances        LABS:                        8.2    6.13  )-----------( 278      ( 25 Dec 2019 05:59 )             26.3     12-24    141  |  106  |  11  ----------------------------<  115<H>  3.9   |  22  |  0.89    Ca    8.5      24 Dec 2019 10:16  Mg     1.5     12-24    TPro  7.4  /  Alb  3.1<L>  /  TBili  0.2  /  DBili  x   /  AST  12  /  ALT  6<L>  /  AlkPhos  81  12-24        CAPILLARY BLOOD GLUCOSE      POCT Blood Glucose.: 132 mg/dL (23 Dec 2019 15:45)      RADIOLOGY & ADDITIONAL TESTS: Reviewed. OVERNIGHT EVENTS: No acute events    SUBJECTIVE / INTERVAL HPI: Patient seen and examined at bedside. Pt notes b/l knee and ankle pain. Denied f/c, abd pain, n/v, d/c, dyspnea, chest pain, HA, vision changes.     VITAL SIGNS:  Vital Signs Last 24 Hrs  T(C): 36.5 (25 Dec 2019 05:24), Max: 36.7 (24 Dec 2019 09:25)  T(F): 97.7 (25 Dec 2019 05:24), Max: 98.1 (24 Dec 2019 09:25)  HR: 99 (25 Dec 2019 05:24) (94 - 103)  BP: 154/81 (25 Dec 2019 05:24) (121/68 - 158/95)  BP(mean): --  RR: 18 (25 Dec 2019 05:24) (16 - 18)  SpO2: 95% (25 Dec 2019 05:24) (91% - 100%)    PHYSICAL EXAM:  General: Unkempt male in NAD  HEENT: NC/AT; PERRL, anicteric sclera; MMM  Neck: supple  Cardiovascular: +S1/S2, RRR  Respiratory: CTA B/L; no W/R/R  Gastrointestinal: soft, NT/ND; +BS  Extremities: WWP; no edema present  Vascular: 2+ radial pulses B/L  Neurological: AAOx3; no focal deficits     MEDICATIONS:  MEDICATIONS  (STANDING):  albuterol/ipratropium for Nebulization 3 milliLiter(s) Nebulizer every 6 hours  amitriptyline 100 milliGRAM(s) Oral at bedtime  amLODIPine   Tablet 2.5 milliGRAM(s) Oral daily  enoxaparin Injectable 40 milliGRAM(s) SubCutaneous every 24 hours  ferrous    sulfate 325 milliGRAM(s) Oral daily  folic acid 1 milliGRAM(s) Oral every 24 hours  gabapentin 100 milliGRAM(s) Oral daily  influenza   Vaccine 0.5 milliLiter(s) IntraMuscular once  methadone    Tablet 80 milliGRAM(s) Oral every 24 hours  tiotropium 18 MICROgram(s) Capsule 1 Capsule(s) Inhalation daily    MEDICATIONS  (PRN):  acetaminophen   Tablet .. 650 milliGRAM(s) Oral every 6 hours PRN Mild Pain (1 - 3)      ALLERGIES:  Allergies    Talwin Compound (Anaphylaxis)    Intolerances        LABS:                        8.2    6.13  )-----------( 278      ( 25 Dec 2019 05:59 )             26.3     12-24    141  |  106  |  11  ----------------------------<  115<H>  3.9   |  22  |  0.89    Ca    8.5      24 Dec 2019 10:16  Mg     1.5     12-24    TPro  7.4  /  Alb  3.1<L>  /  TBili  0.2  /  DBili  x   /  AST  12  /  ALT  6<L>  /  AlkPhos  81  12-24        CAPILLARY BLOOD GLUCOSE      POCT Blood Glucose.: 132 mg/dL (23 Dec 2019 15:45)      RADIOLOGY & ADDITIONAL TESTS: Reviewed. OVERNIGHT EVENTS: No acute events    SUBJECTIVE / INTERVAL HPI: Patient seen and examined at bedside. Pt notes b/l knee and ankle pain. Denied f/c, abd pain, n/v, d/c, dyspnea, chest pain, HA, vision changes. 12 pt ROS is otherwise negative    VITAL SIGNS:  Vital Signs Last 24 Hrs  T(C): 36.5 (25 Dec 2019 05:24), Max: 36.7 (24 Dec 2019 09:25)  T(F): 97.7 (25 Dec 2019 05:24), Max: 98.1 (24 Dec 2019 09:25)  HR: 99 (25 Dec 2019 05:24) (94 - 103)  BP: 154/81 (25 Dec 2019 05:24) (121/68 - 158/95)  BP(mean): --  RR: 18 (25 Dec 2019 05:24) (16 - 18)  SpO2: 95% (25 Dec 2019 05:24) (91% - 100%)    PHYSICAL EXAM:  General: Unkempt male in NAD  HEENT: NC/AT; PERRL, anicteric sclera; MMM  Neck: supple  Cardiovascular: +S1/S2, RRR  Respiratory: CTA B/L; no W/R/R  Gastrointestinal: soft, NT/ND; +BS  Extremities: WWP; no edema present  Vascular: 2+ radial pulses B/L  Neurological: AAOx3; no focal deficits   MSK: R knee with swelling, no TTP, full ROM  Psych: normal affect    MEDICATIONS:  MEDICATIONS  (STANDING):  albuterol/ipratropium for Nebulization 3 milliLiter(s) Nebulizer every 6 hours  amitriptyline 100 milliGRAM(s) Oral at bedtime  amLODIPine   Tablet 2.5 milliGRAM(s) Oral daily  enoxaparin Injectable 40 milliGRAM(s) SubCutaneous every 24 hours  ferrous    sulfate 325 milliGRAM(s) Oral daily  folic acid 1 milliGRAM(s) Oral every 24 hours  gabapentin 100 milliGRAM(s) Oral daily  influenza   Vaccine 0.5 milliLiter(s) IntraMuscular once  methadone    Tablet 80 milliGRAM(s) Oral every 24 hours  tiotropium 18 MICROgram(s) Capsule 1 Capsule(s) Inhalation daily    MEDICATIONS  (PRN):  acetaminophen   Tablet .. 650 milliGRAM(s) Oral every 6 hours PRN Mild Pain (1 - 3)      ALLERGIES:  Allergies    Talwin Compound (Anaphylaxis)    Intolerances        LABS:                        8.2    6.13  )-----------( 278      ( 25 Dec 2019 05:59 )             26.3     12-24    141  |  106  |  11  ----------------------------<  115<H>  3.9   |  22  |  0.89    Ca    8.5      24 Dec 2019 10:16  Mg     1.5     12-24    TPro  7.4  /  Alb  3.1<L>  /  TBili  0.2  /  DBili  x   /  AST  12  /  ALT  6<L>  /  AlkPhos  81  12-24        CAPILLARY BLOOD GLUCOSE      POCT Blood Glucose.: 132 mg/dL (23 Dec 2019 15:45)      RADIOLOGY & ADDITIONAL TESTS: Reviewed.

## 2019-12-25 NOTE — PROGRESS NOTE ADULT - PROBLEM SELECTOR PLAN 7
Hb 9.2, Pt describes complicated recent course at [facility] with large volume epistaxis   -F/u iron studies

## 2019-12-25 NOTE — CHART NOTE - NSCHARTNOTEFT_GEN_A_CORE
Neurosurgery Sign off note:     - MRI brain with IV contrast was performed on 12/24, imaging reviwed with Dr. Moraes, no evidence of metastatic disease   Neurosurgery will sign off, please call/page neurosurgery with any questions.     < from: MR Head w/ IV Cont (12.24.19 @ 20:10) >    IMPRESSION:  1. Multiple foci of chronic small vessel ischemic changes bilaterally.  2. No evidence of metastatic disease.    < end of copied text >

## 2019-12-25 NOTE — PROGRESS NOTE ADULT - PROBLEM SELECTOR PLAN 1
Pt BIBEMS after being found down in street by methadone clinic, c/o progressive weakness since d/c from [place]. no weakness appreciated on neuro exam. CTH with possible vasogenic edema, concern for metastatic disease.  -MRI head showing multiple foci of chronic small vessel disease without mets  -PT recs naty  -f/u x-ray reads of knees and ankles, ordered b/c of pain

## 2019-12-25 NOTE — DISCHARGE NOTE PROVIDER - NSDCMRMEDTOKEN_GEN_ALL_CORE_FT
amitriptyline 100 mg oral tablet: 1 tab(s) orally once a day (at bedtime)  amLODIPine 2.5 mg oral tablet: 1 tab(s) orally once a day  betamethasone valerate 0.1% topical cream: Apply topically to affected area 2 times a day  docusate potassium 100 mg oral capsule: 1 cap(s) orally once a day  gabapentin 100 mg oral capsule: 1 cap(s) orally once a day  Senna 8.6 mg oral tablet: 1 tab(s) orally once a day (at bedtime)  Spiriva 18 mcg inhalation capsule: 1 cap(s) inhaled once a day  umeclidinium 62.5 mcg/inh inhalation powder: 1 puff(s) inhaled once a day acetaminophen 325 mg oral tablet: 2 tab(s) orally every 6 hours, As needed, Mild Pain (1 - 3)  amitriptyline 100 mg oral tablet: 1 tab(s) orally once a day (at bedtime)  amLODIPine 2.5 mg oral tablet: 1 tab(s) orally once a day  betamethasone valerate 0.1% topical cream: Apply topically to affected area 2 times a day  docusate potassium 100 mg oral capsule: 1 cap(s) orally once a day  folic acid 1 mg oral tablet: 1 tab(s) orally every 24 hours  gabapentin 100 mg oral capsule: 1 cap(s) orally once a day  methadone 10 mg oral tablet: 8 tab(s) orally every 24 hours  Senna 8.6 mg oral tablet: 1 tab(s) orally once a day (at bedtime)  Spiriva 18 mcg inhalation capsule: 1 cap(s) inhaled once a day  umeclidinium 62.5 mcg/inh inhalation powder: 1 puff(s) inhaled once a day

## 2019-12-25 NOTE — DISCHARGE NOTE PROVIDER - HOSPITAL COURSE
64M PMH lung cancer s/p lobectomy (one month ago at [facility] c/b PNA, hyponatremia, and large volume epistasis - 250 cc), PAD with left stenting, osteomyelitis of left big toe s/p amputation, COPD/emphysema, heroin use now on methadone presents with progressive LE weakness has been "collapsing" since he was discharged from [place], saying legs just "give out". Pt dc to [facility].        #Weakness: Pt BIBEMS after being found down in street by methadone clinic, c/o progressive weakness since d/c from [place]. No weakness appreciated on neuro exam. CTH with possible vasogenic edema, concern for metastatic disease, however, MRI only showing multiple foci of chronic small vessel disease without mets. PT recs naty.        #Acute respiratory failure with hypoxia: In ED, 2/2 Methadone use and ativan for MRI pretreatment. Now resolved. Given incentive spirometer.        #Emphysema/COPD: Severe confluent bullous emphysematous changes in the bilateral upper lung zones with dilation of the main pulmonary artery measuring up to 4.2 cm. Gave Spiriva and Albuterol PRN.        #History of lung cancer: pt s/p LIZY wedge resection for 2.1 cm squamous cell ca and + paratracheal LNs.         #Peripheral arterial disease: States had "stent" in LLE, was on Plavix for stent, then stopped when had large volume epistaxis. Also left big toe amputation s/p OM. Pt with faint pulses with ext WWP on exam.        #DAYSI: given iron during admission. D/c at discharge.         #Methadone dependence: started Methadone 80mg inpatient and ctm QTc. 64M w/ PMH lung cancer s/p lobectomy (one month ago at [facility] c/b PNA, hyponatremia, and large volume epistasis - 250 cc), PAD with right aortic femoral bypass, w/ right popliteal stent placed in Sept 2019, osteomyelitis of left big toe s/p amputation, COPD/emphysema, heroin use now on methadone presents with progressive LE weakness has been "collapsing" since he was discharged from [place], saying legs just "give out". Weakness 2/2 muscle wasting in setting of recent long admission at [facility].        #Weakness: Pt BIBEMS after being found down in street by methadone clinic, c/o progressive weakness since d/c from [place]. No weakness appreciated on neuro exam. CTH with possible vasogenic edema, concern for metastatic disease, however, MRI only showing multiple foci of chronic small vessel disease without mets. X-ray of knee and ankle without fracture/. Prior to admission, pt refused to use walker. PT recommending home with use of walker. Counciled on importance of using walker.         #Acute respiratory failure with hypoxia: In ED, 2/2 Methadone use and ativan for MRI pretreatment. Now resolved. Given incentive spirometer.        #Emphysema/COPD: Severe confluent bullous emphysematous changes in the bilateral upper lung zones with dilation of the main pulmonary artery measuring up to 4.2 cm. Gave Spiriva and Albuterol PRN.        #History of lung cancer: pt s/p LIZY wedge resection for 2.1 cm squamous cell ca and + paratracheal LNs.         #Peripheral arterial disease: States had "stent" in LLE, was on Plavix for stent, then stopped when had large volume epistaxis. Also left big toe amputation s/p OM. Pt with faint pulses with ext WWP on exam.        #DAYSI: given iron during admission. D/c at discharge.         #Methadone dependence: started Methadone 80mg inpatient and ctm QTc. 64M w/ PMH lung cancer s/p lobectomy (one month ago at [facility] c/b PNA, hyponatremia, and large volume epistasis - 250 cc), PAD with right aortic femoral bypass, w/ right popliteal stent placed in Sept 2019, osteomyelitis of left big toe s/p amputation, COPD/emphysema, heroin use now on methadone presents with progressive LE weakness has been "collapsing" since he was discharged from [place], saying legs just "give out". Weakness 2/2 muscle wasting in setting of recent long admission at [facility].        #Weakness: Pt BIBEMS after being found down in street by methadone clinic, c/o progressive weakness since d/c from [place]. No weakness appreciated on neuro exam. CTH with possible vasogenic edema, concern for metastatic disease, however, MRI only showing multiple foci of chronic small vessel disease without mets. X-ray of knee and ankle without fracture/dislocation. Prior to admission, pt refused to use his walker. Here, PT recommending home with continued use of his walker. Counciled on importance of using walker.         #Acute respiratory failure with hypoxia: In ED, 2/2 Methadone use and ativan for MRI pretreatment. Now resolved. Given incentive spirometer.        #Emphysema/COPD: Severe confluent bullous emphysematous changes in the bilateral upper lung zones with dilation of the main pulmonary artery measuring up to 4.2 cm. Gave Spiriva and Albuterol PRN.        #History of lung cancer: pt s/p LIZY wedge resection for 2.1 cm squamous cell ca and + paratracheal LNs.         #Peripheral arterial disease: Pt had right aortic femoral bypass, w/ right popliteal stent placed in Sept 2019. During November 2019 admission at [facility], pt had large volume epistaxis and was discharged home off of Plavix x1 week when he was supposed to f/u with Dr. Mata Rebolledo to discuss restarting plavix. Also left big toe amputation s/p OM. Pt with faint pulses with ext WWP on exam.        #DAYSI: given iron during admission. D/c at discharge.         #Methadone dependence: started Methadone 80mg inpatient and ctm QTc.         New Meds - folate supplement

## 2019-12-25 NOTE — PROGRESS NOTE ADULT - PROBLEM SELECTOR PLAN 6
#PAD  States had "stent" in LLE, was on Plavix for stent, then stopped when had large volume epistaxis. Also left big toe amputation s/p OM. A1c 5.6   -will hold off on vascular consult until MRI and PT eval; faint pulses with ext WWP on exam

## 2019-12-26 VITALS
OXYGEN SATURATION: 92 % | HEART RATE: 83 BPM | DIASTOLIC BLOOD PRESSURE: 93 MMHG | TEMPERATURE: 97 F | SYSTOLIC BLOOD PRESSURE: 153 MMHG | RESPIRATION RATE: 16 BRPM

## 2019-12-26 LAB
BLD GP AB SCN SERPL QL: NEGATIVE — SIGNIFICANT CHANGE UP
HCT VFR BLD CALC: 28.2 % — LOW (ref 39–50)
HGB BLD-MCNC: 8.5 G/DL — LOW (ref 13–17)
MCHC RBC-ENTMCNC: 26.5 PG — LOW (ref 27–34)
MCHC RBC-ENTMCNC: 30.1 GM/DL — LOW (ref 32–36)
MCV RBC AUTO: 87.9 FL — SIGNIFICANT CHANGE UP (ref 80–100)
NRBC # BLD: 0 /100 WBCS — SIGNIFICANT CHANGE UP (ref 0–0)
PLATELET # BLD AUTO: 287 K/UL — SIGNIFICANT CHANGE UP (ref 150–400)
RBC # BLD: 3.21 M/UL — LOW (ref 4.2–5.8)
RBC # FLD: 15.1 % — HIGH (ref 10.3–14.5)
RH IG SCN BLD-IMP: POSITIVE — SIGNIFICANT CHANGE UP
WBC # BLD: 6.82 K/UL — SIGNIFICANT CHANGE UP (ref 3.8–10.5)
WBC # FLD AUTO: 6.82 K/UL — SIGNIFICANT CHANGE UP (ref 3.8–10.5)

## 2019-12-26 PROCEDURE — 84443 ASSAY THYROID STIM HORMONE: CPT

## 2019-12-26 PROCEDURE — 84132 ASSAY OF SERUM POTASSIUM: CPT

## 2019-12-26 PROCEDURE — 80053 COMPREHEN METABOLIC PANEL: CPT

## 2019-12-26 PROCEDURE — 87521 HEPATITIS C PROBE&RVRS TRNSC: CPT

## 2019-12-26 PROCEDURE — 86780 TREPONEMA PALLIDUM: CPT

## 2019-12-26 PROCEDURE — 84466 ASSAY OF TRANSFERRIN: CPT

## 2019-12-26 PROCEDURE — 83550 IRON BINDING TEST: CPT

## 2019-12-26 PROCEDURE — 80307 DRUG TEST PRSMV CHEM ANLYZR: CPT

## 2019-12-26 PROCEDURE — 82803 BLOOD GASES ANY COMBINATION: CPT

## 2019-12-26 PROCEDURE — 83540 ASSAY OF IRON: CPT

## 2019-12-26 PROCEDURE — 82607 VITAMIN B-12: CPT

## 2019-12-26 PROCEDURE — 94640 AIRWAY INHALATION TREATMENT: CPT

## 2019-12-26 PROCEDURE — 82962 GLUCOSE BLOOD TEST: CPT

## 2019-12-26 PROCEDURE — 86901 BLOOD TYPING SEROLOGIC RH(D): CPT

## 2019-12-26 PROCEDURE — 70450 CT HEAD/BRAIN W/O DYE: CPT

## 2019-12-26 PROCEDURE — 85045 AUTOMATED RETICULOCYTE COUNT: CPT

## 2019-12-26 PROCEDURE — 80048 BASIC METABOLIC PNL TOTAL CA: CPT

## 2019-12-26 PROCEDURE — 83880 ASSAY OF NATRIURETIC PEPTIDE: CPT

## 2019-12-26 PROCEDURE — A9585: CPT

## 2019-12-26 PROCEDURE — 84484 ASSAY OF TROPONIN QUANT: CPT

## 2019-12-26 PROCEDURE — 87389 HIV-1 AG W/HIV-1&-2 AB AG IA: CPT

## 2019-12-26 PROCEDURE — 97161 PT EVAL LOW COMPLEX 20 MIN: CPT

## 2019-12-26 PROCEDURE — 82330 ASSAY OF CALCIUM: CPT

## 2019-12-26 PROCEDURE — 82746 ASSAY OF FOLIC ACID SERUM: CPT

## 2019-12-26 PROCEDURE — 85027 COMPLETE CBC AUTOMATED: CPT

## 2019-12-26 PROCEDURE — 83735 ASSAY OF MAGNESIUM: CPT

## 2019-12-26 PROCEDURE — 71045 X-RAY EXAM CHEST 1 VIEW: CPT

## 2019-12-26 PROCEDURE — 84295 ASSAY OF SERUM SODIUM: CPT

## 2019-12-26 PROCEDURE — 86850 RBC ANTIBODY SCREEN: CPT

## 2019-12-26 PROCEDURE — 83036 HEMOGLOBIN GLYCOSYLATED A1C: CPT

## 2019-12-26 PROCEDURE — 93005 ELECTROCARDIOGRAM TRACING: CPT

## 2019-12-26 PROCEDURE — 99285 EMERGENCY DEPT VISIT HI MDM: CPT | Mod: 25

## 2019-12-26 PROCEDURE — 86803 HEPATITIS C AB TEST: CPT

## 2019-12-26 PROCEDURE — 71250 CT THORAX DX C-: CPT

## 2019-12-26 PROCEDURE — 82728 ASSAY OF FERRITIN: CPT

## 2019-12-26 PROCEDURE — 73610 X-RAY EXAM OF ANKLE: CPT

## 2019-12-26 PROCEDURE — 85025 COMPLETE CBC W/AUTO DIFF WBC: CPT

## 2019-12-26 PROCEDURE — 99239 HOSP IP/OBS DSCHRG MGMT >30: CPT | Mod: GC

## 2019-12-26 PROCEDURE — 73562 X-RAY EXAM OF KNEE 3: CPT

## 2019-12-26 PROCEDURE — 84480 ASSAY TRIIODOTHYRONINE (T3): CPT

## 2019-12-26 PROCEDURE — 84436 ASSAY OF TOTAL THYROXINE: CPT

## 2019-12-26 PROCEDURE — 36415 COLL VENOUS BLD VENIPUNCTURE: CPT

## 2019-12-26 PROCEDURE — 96374 THER/PROPH/DIAG INJ IV PUSH: CPT | Mod: XU

## 2019-12-26 PROCEDURE — 82550 ASSAY OF CK (CPK): CPT

## 2019-12-26 PROCEDURE — 70552 MRI BRAIN STEM W/DYE: CPT

## 2019-12-26 PROCEDURE — 86900 BLOOD TYPING SEROLOGIC ABO: CPT

## 2019-12-26 RX ORDER — ACETAMINOPHEN 500 MG
2 TABLET ORAL
Qty: 240 | Refills: 0
Start: 2019-12-26 | End: 2020-01-24

## 2019-12-26 RX ORDER — FOLIC ACID 0.8 MG
1 TABLET ORAL
Qty: 30 | Refills: 1
Start: 2019-12-26 | End: 2020-02-23

## 2019-12-26 RX ORDER — METHADONE HYDROCHLORIDE 40 MG/1
8 TABLET ORAL
Qty: 0 | Refills: 0 | DISCHARGE
Start: 2019-12-26

## 2019-12-26 RX ADMIN — Medication 650 MILLIGRAM(S): at 04:02

## 2019-12-26 RX ADMIN — ENOXAPARIN SODIUM 40 MILLIGRAM(S): 100 INJECTION SUBCUTANEOUS at 05:09

## 2019-12-26 RX ADMIN — GABAPENTIN 100 MILLIGRAM(S): 400 CAPSULE ORAL at 12:12

## 2019-12-26 RX ADMIN — METHADONE HYDROCHLORIDE 80 MILLIGRAM(S): 40 TABLET ORAL at 12:13

## 2019-12-26 RX ADMIN — Medication 325 MILLIGRAM(S): at 12:12

## 2019-12-26 RX ADMIN — Medication 3 MILLILITER(S): at 05:09

## 2019-12-26 RX ADMIN — AMLODIPINE BESYLATE 2.5 MILLIGRAM(S): 2.5 TABLET ORAL at 05:09

## 2019-12-26 RX ADMIN — Medication 650 MILLIGRAM(S): at 05:02

## 2019-12-26 RX ADMIN — TIOTROPIUM BROMIDE 1 CAPSULE(S): 18 CAPSULE ORAL; RESPIRATORY (INHALATION) at 12:18

## 2019-12-26 RX ADMIN — Medication 3 MILLILITER(S): at 12:13

## 2019-12-26 RX ADMIN — Medication 1 MILLIGRAM(S): at 12:12

## 2019-12-26 NOTE — DISCHARGE NOTE NURSING/CASE MANAGEMENT/SOCIAL WORK - PATIENT PORTAL LINK FT
You can access the FollowMyHealth Patient Portal offered by [facility] by registering at the following website: [email or web address]. By joining [employer]’s FollowMyHealth portal, you will also be able to view your health information using other applications (apps) compatible with our system.

## 2019-12-27 LAB
HCV RNA FLD QL NAA+PROBE: SIGNIFICANT CHANGE UP
HCV RNA SPEC QL PROBE+SIG AMP: SIGNIFICANT CHANGE UP

## 2020-01-03 DIAGNOSIS — Z95.828 PRESENCE OF OTHER VASCULAR IMPLANTS AND GRAFTS: ICD-10-CM

## 2020-01-03 DIAGNOSIS — R29.898 OTHER SYMPTOMS AND SIGNS INVOLVING THE MUSCULOSKELETAL SYSTEM: ICD-10-CM

## 2020-01-03 DIAGNOSIS — J96.01 ACUTE RESPIRATORY FAILURE WITH HYPOXIA: ICD-10-CM

## 2020-01-03 DIAGNOSIS — Z87.891 PERSONAL HISTORY OF NICOTINE DEPENDENCE: ICD-10-CM

## 2020-01-03 DIAGNOSIS — J43.9 EMPHYSEMA, UNSPECIFIED: ICD-10-CM

## 2020-01-03 DIAGNOSIS — G93.6 CEREBRAL EDEMA: ICD-10-CM

## 2020-01-03 DIAGNOSIS — D50.9 IRON DEFICIENCY ANEMIA, UNSPECIFIED: ICD-10-CM

## 2020-01-03 DIAGNOSIS — T40.3X5A ADVERSE EFFECT OF METHADONE, INITIAL ENCOUNTER: ICD-10-CM

## 2020-01-03 DIAGNOSIS — C34.12 MALIGNANT NEOPLASM OF UPPER LOBE, LEFT BRONCHUS OR LUNG: ICD-10-CM

## 2020-01-03 DIAGNOSIS — Z89.422 ACQUIRED ABSENCE OF OTHER LEFT TOE(S): ICD-10-CM

## 2020-01-03 DIAGNOSIS — I73.9 PERIPHERAL VASCULAR DISEASE, UNSPECIFIED: ICD-10-CM

## 2020-01-03 DIAGNOSIS — R79.89 OTHER SPECIFIED ABNORMAL FINDINGS OF BLOOD CHEMISTRY: ICD-10-CM

## 2020-01-03 DIAGNOSIS — J98.11 ATELECTASIS: ICD-10-CM

## 2020-01-03 DIAGNOSIS — R94.6 ABNORMAL RESULTS OF THYROID FUNCTION STUDIES: ICD-10-CM

## 2020-01-03 DIAGNOSIS — F11.20 OPIOID DEPENDENCE, UNCOMPLICATED: ICD-10-CM

## 2020-01-03 DIAGNOSIS — E53.8 DEFICIENCY OF OTHER SPECIFIED B GROUP VITAMINS: ICD-10-CM

## 2020-01-03 DIAGNOSIS — G92 TOXIC ENCEPHALOPATHY: ICD-10-CM

## 2020-01-03 DIAGNOSIS — Z88.5 ALLERGY STATUS TO NARCOTIC AGENT: ICD-10-CM
